# Patient Record
Sex: FEMALE | Race: WHITE | NOT HISPANIC OR LATINO
[De-identification: names, ages, dates, MRNs, and addresses within clinical notes are randomized per-mention and may not be internally consistent; named-entity substitution may affect disease eponyms.]

---

## 2017-01-13 ENCOUNTER — APPOINTMENT (OUTPATIENT)
Dept: FAMILY MEDICINE | Facility: CLINIC | Age: 77
End: 2017-01-13

## 2017-01-13 VITALS
WEIGHT: 194 LBS | HEIGHT: 65 IN | HEART RATE: 72 BPM | BODY MASS INDEX: 32.32 KG/M2 | SYSTOLIC BLOOD PRESSURE: 120 MMHG | RESPIRATION RATE: 14 BRPM | DIASTOLIC BLOOD PRESSURE: 64 MMHG

## 2017-01-19 ENCOUNTER — OUTPATIENT (OUTPATIENT)
Dept: OUTPATIENT SERVICES | Facility: HOSPITAL | Age: 77
LOS: 1 days | End: 2017-01-19
Payer: MEDICARE

## 2017-01-19 DIAGNOSIS — S81.802A UNSPECIFIED OPEN WOUND, LEFT LOWER LEG, INITIAL ENCOUNTER: ICD-10-CM

## 2017-01-19 DIAGNOSIS — I73.9 PERIPHERAL VASCULAR DISEASE, UNSPECIFIED: ICD-10-CM

## 2017-01-19 DIAGNOSIS — E08.8 DIABETES MELLITUS DUE TO UNDERLYING CONDITION WITH UNSPECIFIED COMPLICATIONS: ICD-10-CM

## 2017-01-19 PROCEDURE — 93923 UPR/LXTR ART STDY 3+ LVLS: CPT

## 2017-02-10 ENCOUNTER — APPOINTMENT (OUTPATIENT)
Dept: FAMILY MEDICINE | Facility: CLINIC | Age: 77
End: 2017-02-10

## 2017-02-10 VITALS — DIASTOLIC BLOOD PRESSURE: 70 MMHG | HEART RATE: 74 BPM | SYSTOLIC BLOOD PRESSURE: 118 MMHG | RESPIRATION RATE: 18 BRPM

## 2017-02-14 LAB
ALBUMIN SERPL ELPH-MCNC: 4.3 G/DL
ALP BLD-CCNC: 55 U/L
ALT SERPL-CCNC: 14 U/L
ANION GAP SERPL CALC-SCNC: 15 MMOL/L
AST SERPL-CCNC: 18 U/L
BASOPHILS # BLD AUTO: 0.03 K/UL
BASOPHILS NFR BLD AUTO: 0.4 %
BILIRUB SERPL-MCNC: 0.4 MG/DL
BUN SERPL-MCNC: 25 MG/DL
CALCIUM SERPL-MCNC: 10 MG/DL
CHLORIDE SERPL-SCNC: 103 MMOL/L
CHOLEST SERPL-MCNC: 132 MG/DL
CHOLEST/HDLC SERPL: 1.7 RATIO
CO2 SERPL-SCNC: 23 MMOL/L
CREAT SERPL-MCNC: 0.95 MG/DL
EOSINOPHIL # BLD AUTO: 0.13 K/UL
EOSINOPHIL NFR BLD AUTO: 1.6 %
GLUCOSE SERPL-MCNC: 148 MG/DL
HBA1C MFR BLD HPLC: 6.6 %
HCT VFR BLD CALC: 43.3 %
HDLC SERPL-MCNC: 77 MG/DL
HGB BLD-MCNC: 14.1 G/DL
IMM GRANULOCYTES NFR BLD AUTO: 0.4 %
LDLC SERPL CALC-MCNC: 44 MG/DL
LYMPHOCYTES # BLD AUTO: 2 K/UL
LYMPHOCYTES NFR BLD AUTO: 24.6 %
MAN DIFF?: NORMAL
MCHC RBC-ENTMCNC: 30.1 PG
MCHC RBC-ENTMCNC: 32.6 GM/DL
MCV RBC AUTO: 92.5 FL
MONOCYTES # BLD AUTO: 0.71 K/UL
MONOCYTES NFR BLD AUTO: 8.7 %
NEUTROPHILS # BLD AUTO: 5.22 K/UL
NEUTROPHILS NFR BLD AUTO: 64.3 %
PLATELET # BLD AUTO: 274 K/UL
POTASSIUM SERPL-SCNC: 4.9 MMOL/L
PROT SERPL-MCNC: 7.6 G/DL
RBC # BLD: 4.68 M/UL
RBC # FLD: 13.1 %
SODIUM SERPL-SCNC: 141 MMOL/L
TRIGL SERPL-MCNC: 56 MG/DL
TSH SERPL-ACNC: 1.24 UIU/ML
WBC # FLD AUTO: 8.12 K/UL

## 2017-03-05 ENCOUNTER — RX RENEWAL (OUTPATIENT)
Age: 77
End: 2017-03-05

## 2017-03-15 ENCOUNTER — RX RENEWAL (OUTPATIENT)
Age: 77
End: 2017-03-15

## 2017-05-01 ENCOUNTER — RX RENEWAL (OUTPATIENT)
Age: 77
End: 2017-05-01

## 2017-05-05 ENCOUNTER — RX RENEWAL (OUTPATIENT)
Age: 77
End: 2017-05-05

## 2017-08-04 ENCOUNTER — RX RENEWAL (OUTPATIENT)
Age: 77
End: 2017-08-04

## 2017-08-11 ENCOUNTER — APPOINTMENT (OUTPATIENT)
Dept: FAMILY MEDICINE | Facility: CLINIC | Age: 77
End: 2017-08-11

## 2017-08-11 ENCOUNTER — APPOINTMENT (OUTPATIENT)
Dept: FAMILY MEDICINE | Facility: CLINIC | Age: 77
End: 2017-08-11
Payer: MEDICARE

## 2017-08-11 VITALS
SYSTOLIC BLOOD PRESSURE: 138 MMHG | HEIGHT: 65 IN | DIASTOLIC BLOOD PRESSURE: 83 MMHG | OXYGEN SATURATION: 97 % | TEMPERATURE: 98.5 F | BODY MASS INDEX: 33.32 KG/M2 | WEIGHT: 200 LBS | HEART RATE: 64 BPM

## 2017-08-11 DIAGNOSIS — M25.511 PAIN IN RIGHT SHOULDER: ICD-10-CM

## 2017-08-11 DIAGNOSIS — M25.561 PAIN IN RIGHT KNEE: ICD-10-CM

## 2017-08-11 DIAGNOSIS — R31.29 OTHER MICROSCOPIC HEMATURIA: ICD-10-CM

## 2017-08-11 PROCEDURE — 99214 OFFICE O/P EST MOD 30 MIN: CPT

## 2017-08-11 RX ORDER — FLUOCINONIDE 0.05 MG/G
0.05 OINTMENT TOPICAL
Qty: 120 | Refills: 0 | Status: COMPLETED | COMMUNITY
Start: 2017-03-02

## 2017-08-11 RX ORDER — TRIAMCINOLONE ACETONIDE 1 MG/G
0.1 CREAM TOPICAL
Qty: 15 | Refills: 0 | Status: COMPLETED | COMMUNITY
Start: 2017-03-21

## 2017-08-11 RX ORDER — CLOBETASOL PROPIONATE 0.5 MG/G
0.05 OINTMENT TOPICAL
Qty: 60 | Refills: 0 | Status: COMPLETED | COMMUNITY
Start: 2017-04-04

## 2017-08-11 RX ORDER — METFORMIN HYDROCHLORIDE 500 MG/1
500 TABLET, COATED ORAL DAILY
Refills: 0 | Status: COMPLETED | COMMUNITY
End: 2017-08-11

## 2017-08-11 RX ORDER — IMIQUIMOD 37.5 MG/G
3.75 CREAM TOPICAL
Qty: 28 | Refills: 0 | Status: COMPLETED | COMMUNITY
Start: 2017-03-22

## 2017-08-23 ENCOUNTER — FORM ENCOUNTER (OUTPATIENT)
Age: 77
End: 2017-08-23

## 2017-08-24 ENCOUNTER — OUTPATIENT (OUTPATIENT)
Dept: OUTPATIENT SERVICES | Facility: HOSPITAL | Age: 77
LOS: 1 days | End: 2017-08-24
Payer: MEDICARE

## 2017-08-24 ENCOUNTER — APPOINTMENT (OUTPATIENT)
Dept: RADIOLOGY | Facility: HOSPITAL | Age: 77
End: 2017-08-24
Payer: MEDICARE

## 2017-08-24 DIAGNOSIS — M17.11 UNILATERAL PRIMARY OSTEOARTHRITIS, RIGHT KNEE: ICD-10-CM

## 2017-08-24 DIAGNOSIS — M19.011 PRIMARY OSTEOARTHRITIS, RIGHT SHOULDER: ICD-10-CM

## 2017-08-24 PROCEDURE — 73562 X-RAY EXAM OF KNEE 3: CPT | Mod: 26,RT

## 2017-08-24 PROCEDURE — 73030 X-RAY EXAM OF SHOULDER: CPT | Mod: 26,RT

## 2017-08-24 PROCEDURE — 73030 X-RAY EXAM OF SHOULDER: CPT

## 2017-08-24 PROCEDURE — 73562 X-RAY EXAM OF KNEE 3: CPT

## 2017-09-06 ENCOUNTER — RX RENEWAL (OUTPATIENT)
Age: 77
End: 2017-09-06

## 2017-09-07 ENCOUNTER — RESULT REVIEW (OUTPATIENT)
Age: 77
End: 2017-09-07

## 2017-09-07 ENCOUNTER — MEDICATION RENEWAL (OUTPATIENT)
Age: 77
End: 2017-09-07

## 2017-09-07 LAB
25(OH)D3 SERPL-MCNC: 19 NG/ML
ALBUMIN SERPL ELPH-MCNC: 3.4 G/DL
ALP BLD-CCNC: 49 U/L
ALT SERPL-CCNC: 15 U/L
ANION GAP SERPL CALC-SCNC: 16 MMOL/L
APPEARANCE: CLEAR
AST SERPL-CCNC: 20 U/L
BACTERIA UR CULT: ABNORMAL
BASOPHILS # BLD AUTO: 0.02 K/UL
BASOPHILS NFR BLD AUTO: 0.3 %
BILIRUB DIRECT SERPL-MCNC: 0.1 MG/DL
BILIRUB INDIRECT SERPL-MCNC: 0.3 MG/DL
BILIRUB SERPL-MCNC: 0.4 MG/DL
BILIRUBIN URINE: NEGATIVE
BLOOD URINE: NEGATIVE
BUN SERPL-MCNC: 22 MG/DL
CALCIUM SERPL-MCNC: 9.5 MG/DL
CHLORIDE SERPL-SCNC: 103 MMOL/L
CHOLEST SERPL-MCNC: 118 MG/DL
CHOLEST/HDLC SERPL: 1.7 RATIO
CO2 SERPL-SCNC: 19 MMOL/L
COLOR: YELLOW
CREAT SERPL-MCNC: 1.01 MG/DL
CREAT SPEC-SCNC: 93 MG/DL
EOSINOPHIL # BLD AUTO: 0.07 K/UL
EOSINOPHIL NFR BLD AUTO: 1.1 %
FOLATE SERPL-MCNC: 13.5 NG/ML
GLUCOSE QUALITATIVE U: NORMAL MG/DL
GLUCOSE SERPL-MCNC: 140 MG/DL
HAV IGM SER QL: NONREACTIVE
HBA1C MFR BLD HPLC: 6.8 %
HBV CORE IGM SER QL: NONREACTIVE
HBV SURFACE AG SER QL: NONREACTIVE
HCT VFR BLD CALC: 38.1 %
HCV AB SER QL: NONREACTIVE
HCV S/CO RATIO: 0.08 S/CO
HDLC SERPL-MCNC: 68 MG/DL
HGB BLD-MCNC: 12.5 G/DL
IMM GRANULOCYTES NFR BLD AUTO: 0.5 %
KETONES URINE: NEGATIVE
LDLC SERPL CALC-MCNC: 38 MG/DL
LEUKOCYTE ESTERASE URINE: ABNORMAL
LYMPHOCYTES # BLD AUTO: 1.74 K/UL
LYMPHOCYTES NFR BLD AUTO: 28.1 %
MAN DIFF?: NORMAL
MCHC RBC-ENTMCNC: 31.1 PG
MCHC RBC-ENTMCNC: 32.8 GM/DL
MCV RBC AUTO: 94.8 FL
MICROALBUMIN 24H UR DL<=1MG/L-MCNC: 3.3 MG/DL
MICROALBUMIN/CREAT 24H UR-RTO: 35 MG/G
MONOCYTES # BLD AUTO: 0.87 K/UL
MONOCYTES NFR BLD AUTO: 14 %
NEUTROPHILS # BLD AUTO: 3.47 K/UL
NEUTROPHILS NFR BLD AUTO: 56 %
NITRITE URINE: POSITIVE
PH URINE: 6
PLATELET # BLD AUTO: 225 K/UL
POTASSIUM SERPL-SCNC: 4.7 MMOL/L
PROT SERPL-MCNC: 7 G/DL
PROTEIN URINE: ABNORMAL MG/DL
RBC # BLD: 4.02 M/UL
RBC # FLD: 12.7 %
SODIUM SERPL-SCNC: 138 MMOL/L
SPECIFIC GRAVITY URINE: 1.02
TRIGL SERPL-MCNC: 60 MG/DL
TSH SERPL-ACNC: 1.82 UIU/ML
URATE SERPL-MCNC: 4.6 MG/DL
UROBILINOGEN URINE: 1 MG/DL
VIT B12 SERPL-MCNC: 247 PG/ML
WBC # FLD AUTO: 6.2 K/UL

## 2017-09-19 ENCOUNTER — APPOINTMENT (OUTPATIENT)
Dept: FAMILY MEDICINE | Facility: CLINIC | Age: 77
End: 2017-09-19

## 2017-10-03 ENCOUNTER — RX RENEWAL (OUTPATIENT)
Age: 77
End: 2017-10-03

## 2017-10-05 ENCOUNTER — RX RENEWAL (OUTPATIENT)
Age: 77
End: 2017-10-05

## 2017-10-23 ENCOUNTER — APPOINTMENT (OUTPATIENT)
Dept: FAMILY MEDICINE | Facility: CLINIC | Age: 77
End: 2017-10-23
Payer: MEDICARE

## 2017-10-23 VITALS
WEIGHT: 205 LBS | HEART RATE: 70 BPM | OXYGEN SATURATION: 94 % | HEIGHT: 63.5 IN | SYSTOLIC BLOOD PRESSURE: 153 MMHG | DIASTOLIC BLOOD PRESSURE: 93 MMHG | BODY MASS INDEX: 35.87 KG/M2

## 2017-10-23 DIAGNOSIS — Z87.898 PERSONAL HISTORY OF OTHER SPECIFIED CONDITIONS: ICD-10-CM

## 2017-10-23 DIAGNOSIS — Z83.3 FAMILY HISTORY OF DIABETES MELLITUS: ICD-10-CM

## 2017-10-23 PROCEDURE — 99214 OFFICE O/P EST MOD 30 MIN: CPT

## 2017-10-23 RX ORDER — LIFITEGRAST 50 MG/ML
5 SOLUTION/ DROPS OPHTHALMIC
Qty: 60 | Refills: 0 | Status: COMPLETED | COMMUNITY
Start: 2017-09-05

## 2017-10-23 RX ORDER — CIPROFLOXACIN HYDROCHLORIDE 250 MG/1
250 TABLET, FILM COATED ORAL TWICE DAILY
Qty: 10 | Refills: 0 | Status: COMPLETED | COMMUNITY
Start: 2017-09-07 | End: 2017-10-23

## 2017-10-24 LAB
APPEARANCE: CLEAR
BACTERIA: ABNORMAL
BILIRUBIN URINE: NEGATIVE
BLOOD URINE: NEGATIVE
COLOR: YELLOW
GLUCOSE QUALITATIVE U: NEGATIVE MG/DL
HYALINE CASTS: 3 /LPF
KETONES URINE: NEGATIVE
LEUKOCYTE ESTERASE URINE: ABNORMAL
MICROSCOPIC-UA: NORMAL
NITRITE URINE: NEGATIVE
PH URINE: 5
PROTEIN URINE: NEGATIVE MG/DL
RED BLOOD CELLS URINE: 2 /HPF
SPECIFIC GRAVITY URINE: 1.01
SQUAMOUS EPITHELIAL CELLS: 0 /HPF
UROBILINOGEN URINE: NEGATIVE MG/DL
WHITE BLOOD CELLS URINE: 40 /HPF

## 2017-11-07 ENCOUNTER — RX RENEWAL (OUTPATIENT)
Age: 77
End: 2017-11-07

## 2017-11-21 ENCOUNTER — APPOINTMENT (OUTPATIENT)
Dept: FAMILY MEDICINE | Facility: CLINIC | Age: 77
End: 2017-11-21
Payer: MEDICARE

## 2017-11-21 VITALS
DIASTOLIC BLOOD PRESSURE: 80 MMHG | HEART RATE: 44 BPM | BODY MASS INDEX: 37.8 KG/M2 | SYSTOLIC BLOOD PRESSURE: 130 MMHG | OXYGEN SATURATION: 98 % | HEIGHT: 63.5 IN | WEIGHT: 216 LBS

## 2017-11-21 DIAGNOSIS — H54.7 UNSPECIFIED VISUAL LOSS: ICD-10-CM

## 2017-11-21 DIAGNOSIS — H26.9 UNSPECIFIED CATARACT: ICD-10-CM

## 2017-11-21 DIAGNOSIS — R94.31 ABNORMAL ELECTROCARDIOGRAM [ECG] [EKG]: ICD-10-CM

## 2017-11-21 PROCEDURE — 99215 OFFICE O/P EST HI 40 MIN: CPT | Mod: 25

## 2017-11-21 PROCEDURE — 93000 ELECTROCARDIOGRAM COMPLETE: CPT | Mod: 59

## 2017-11-21 PROCEDURE — 36415 COLL VENOUS BLD VENIPUNCTURE: CPT

## 2017-11-21 RX ORDER — VIT C/E/ZN/COPPR/LUTEIN/ZEAXAN 250MG-90MG
CAPSULE ORAL
Refills: 0 | Status: ACTIVE | COMMUNITY
Start: 2017-11-21

## 2017-11-22 LAB
25(OH)D3 SERPL-MCNC: 20.2 NG/ML
ALBUMIN SERPL ELPH-MCNC: 3.9 G/DL
ALP BLD-CCNC: 54 U/L
ALT SERPL-CCNC: 10 U/L
ANION GAP SERPL CALC-SCNC: 15 MMOL/L
AST SERPL-CCNC: 18 U/L
BASOPHILS # BLD AUTO: 0.02 K/UL
BASOPHILS NFR BLD AUTO: 0.2 %
BILIRUB DIRECT SERPL-MCNC: 0.1 MG/DL
BILIRUB INDIRECT SERPL-MCNC: 0.2 MG/DL
BILIRUB SERPL-MCNC: 0.3 MG/DL
BUN SERPL-MCNC: 23 MG/DL
CALCIUM SERPL-MCNC: 9.4 MG/DL
CHLORIDE SERPL-SCNC: 101 MMOL/L
CHOLEST SERPL-MCNC: 121 MG/DL
CHOLEST/HDLC SERPL: 1.7 RATIO
CO2 SERPL-SCNC: 21 MMOL/L
CREAT SERPL-MCNC: 1.04 MG/DL
EOSINOPHIL # BLD AUTO: 0.08 K/UL
EOSINOPHIL NFR BLD AUTO: 1 %
GLUCOSE SERPL-MCNC: 151 MG/DL
HBA1C MFR BLD HPLC: 6.5 %
HCT VFR BLD CALC: 40.4 %
HDLC SERPL-MCNC: 71 MG/DL
HGB BLD-MCNC: 13.3 G/DL
IMM GRANULOCYTES NFR BLD AUTO: 0.5 %
LDLC SERPL CALC-MCNC: 35 MG/DL
LYMPHOCYTES # BLD AUTO: 1.96 K/UL
LYMPHOCYTES NFR BLD AUTO: 23.7 %
MAN DIFF?: NORMAL
MCHC RBC-ENTMCNC: 31 PG
MCHC RBC-ENTMCNC: 32.9 GM/DL
MCV RBC AUTO: 94.2 FL
MONOCYTES # BLD AUTO: 0.58 K/UL
MONOCYTES NFR BLD AUTO: 7 %
NEUTROPHILS # BLD AUTO: 5.58 K/UL
NEUTROPHILS NFR BLD AUTO: 67.6 %
NT-PROBNP SERPL-MCNC: 159 PG/ML
PLATELET # BLD AUTO: 236 K/UL
POTASSIUM SERPL-SCNC: 4.8 MMOL/L
PROT SERPL-MCNC: 6.8 G/DL
RBC # BLD: 4.29 M/UL
RBC # FLD: 13.8 %
SODIUM SERPL-SCNC: 137 MMOL/L
TRIGL SERPL-MCNC: 77 MG/DL
TSH SERPL-ACNC: 1.2 UIU/ML
WBC # FLD AUTO: 8.26 K/UL

## 2017-12-04 ENCOUNTER — RX RENEWAL (OUTPATIENT)
Age: 77
End: 2017-12-04

## 2017-12-07 ENCOUNTER — RX RENEWAL (OUTPATIENT)
Age: 77
End: 2017-12-07

## 2018-01-02 ENCOUNTER — RX RENEWAL (OUTPATIENT)
Age: 78
End: 2018-01-02

## 2018-01-03 ENCOUNTER — RX RENEWAL (OUTPATIENT)
Age: 78
End: 2018-01-03

## 2018-01-06 ENCOUNTER — RX RENEWAL (OUTPATIENT)
Age: 78
End: 2018-01-06

## 2018-01-08 ENCOUNTER — RX RENEWAL (OUTPATIENT)
Age: 78
End: 2018-01-08

## 2018-03-13 ENCOUNTER — RX RENEWAL (OUTPATIENT)
Age: 78
End: 2018-03-13

## 2018-03-28 ENCOUNTER — OTHER (OUTPATIENT)
Age: 78
End: 2018-03-28

## 2018-04-18 ENCOUNTER — RX RENEWAL (OUTPATIENT)
Age: 78
End: 2018-04-18

## 2018-04-18 ENCOUNTER — APPOINTMENT (OUTPATIENT)
Dept: FAMILY MEDICINE | Facility: CLINIC | Age: 78
End: 2018-04-18
Payer: MEDICARE

## 2018-04-18 VITALS
BODY MASS INDEX: 36.75 KG/M2 | DIASTOLIC BLOOD PRESSURE: 80 MMHG | WEIGHT: 210 LBS | TEMPERATURE: 98 F | SYSTOLIC BLOOD PRESSURE: 160 MMHG | OXYGEN SATURATION: 98 % | HEART RATE: 67 BPM | HEIGHT: 63.5 IN

## 2018-04-18 DIAGNOSIS — Z01.818 ENCOUNTER FOR OTHER PREPROCEDURAL EXAMINATION: ICD-10-CM

## 2018-04-18 PROCEDURE — 99214 OFFICE O/P EST MOD 30 MIN: CPT

## 2018-04-18 RX ORDER — NEOMYCIN AND POLYMYXIN B SULFATES AND DEXAMETHASONE 3.5; 10000; 1 MG/G; [IU]/G; MG/G
3.5-10000-0.1 OINTMENT OPHTHALMIC
Qty: 4 | Refills: 0 | Status: COMPLETED | COMMUNITY
Start: 2018-01-03

## 2018-04-18 RX ORDER — GATIFLOXACIN 5 MG/ML
0.5 SOLUTION/ DROPS OPHTHALMIC
Qty: 2 | Refills: 0 | Status: COMPLETED | COMMUNITY
Start: 2017-11-16

## 2018-04-18 RX ORDER — SULFAMETHOXAZOLE AND TRIMETHOPRIM 400; 80 MG/1; MG/1
400-80 TABLET ORAL
Qty: 10 | Refills: 0 | Status: COMPLETED | COMMUNITY
Start: 2017-10-31

## 2018-04-18 RX ORDER — PREDNISOLONE ACETATE 10 MG/ML
1 SUSPENSION/ DROPS OPHTHALMIC
Qty: 5 | Refills: 0 | Status: COMPLETED | COMMUNITY
Start: 2017-12-11

## 2018-04-24 ENCOUNTER — FORM ENCOUNTER (OUTPATIENT)
Age: 78
End: 2018-04-24

## 2018-04-25 ENCOUNTER — APPOINTMENT (OUTPATIENT)
Dept: MRI IMAGING | Facility: HOSPITAL | Age: 78
End: 2018-04-25
Payer: MEDICARE

## 2018-04-25 ENCOUNTER — OUTPATIENT (OUTPATIENT)
Dept: OUTPATIENT SERVICES | Facility: HOSPITAL | Age: 78
LOS: 1 days | End: 2018-04-25
Payer: MEDICARE

## 2018-04-25 DIAGNOSIS — S39.92XA UNSPECIFIED INJURY OF LOWER BACK, INITIAL ENCOUNTER: ICD-10-CM

## 2018-04-25 DIAGNOSIS — H25.89 OTHER AGE-RELATED CATARACT: Chronic | ICD-10-CM

## 2018-04-25 DIAGNOSIS — M54.9 DORSALGIA, UNSPECIFIED: ICD-10-CM

## 2018-04-25 PROCEDURE — 72148 MRI LUMBAR SPINE W/O DYE: CPT | Mod: 26

## 2018-04-25 PROCEDURE — 72148 MRI LUMBAR SPINE W/O DYE: CPT

## 2018-05-11 ENCOUNTER — LABORATORY RESULT (OUTPATIENT)
Age: 78
End: 2018-05-11

## 2018-05-14 ENCOUNTER — RX RENEWAL (OUTPATIENT)
Age: 78
End: 2018-05-14

## 2018-05-14 ENCOUNTER — APPOINTMENT (OUTPATIENT)
Dept: ORTHOPEDIC SURGERY | Facility: CLINIC | Age: 78
End: 2018-05-14
Payer: MEDICARE

## 2018-05-14 VITALS
HEART RATE: 69 BPM | SYSTOLIC BLOOD PRESSURE: 150 MMHG | HEIGHT: 63.5 IN | BODY MASS INDEX: 35.7 KG/M2 | WEIGHT: 204 LBS | DIASTOLIC BLOOD PRESSURE: 79 MMHG

## 2018-05-14 DIAGNOSIS — Z87.891 PERSONAL HISTORY OF NICOTINE DEPENDENCE: ICD-10-CM

## 2018-05-14 DIAGNOSIS — Z87.39 PERSONAL HISTORY OF OTHER DISEASES OF THE MUSCULOSKELETAL SYSTEM AND CONNECTIVE TISSUE: ICD-10-CM

## 2018-05-14 DIAGNOSIS — M43.17 SPONDYLOLISTHESIS, LUMBOSACRAL REGION: ICD-10-CM

## 2018-05-14 DIAGNOSIS — E11.9 TYPE 2 DIABETES MELLITUS W/OUT COMPLICATIONS: ICD-10-CM

## 2018-05-14 DIAGNOSIS — Z80.9 FAMILY HISTORY OF MALIGNANT NEOPLASM, UNSPECIFIED: ICD-10-CM

## 2018-05-14 LAB
25(OH)D3 SERPL-MCNC: 25.8 NG/ML
ANION GAP SERPL CALC-SCNC: 15 MMOL/L
BASOPHILS # BLD AUTO: 0.02 K/UL
BASOPHILS NFR BLD AUTO: 0.3 %
BUN SERPL-MCNC: 32 MG/DL
CALCIUM SERPL-MCNC: 9.9 MG/DL
CHLORIDE SERPL-SCNC: 104 MMOL/L
CHOLEST SERPL-MCNC: 125 MG/DL
CHOLEST/HDLC SERPL: 1.9 RATIO
CO2 SERPL-SCNC: 21 MMOL/L
CREAT SERPL-MCNC: 1.09 MG/DL
CREAT SPEC-SCNC: 90 MG/DL
EOSINOPHIL # BLD AUTO: 0.12 K/UL
EOSINOPHIL NFR BLD AUTO: 1.8 %
FOLATE SERPL-MCNC: 10.5 NG/ML
GLUCOSE SERPL-MCNC: 152 MG/DL
HCT VFR BLD CALC: 39.4 %
HDLC SERPL-MCNC: 66 MG/DL
HGB BLD-MCNC: 13.2 G/DL
IMM GRANULOCYTES NFR BLD AUTO: 0.5 %
LDLC SERPL CALC-MCNC: 50 MG/DL
LYMPHOCYTES # BLD AUTO: 2.41 K/UL
LYMPHOCYTES NFR BLD AUTO: 37.1 %
MAN DIFF?: NORMAL
MCHC RBC-ENTMCNC: 30.8 PG
MCHC RBC-ENTMCNC: 33.5 GM/DL
MCV RBC AUTO: 92.1 FL
MICROALBUMIN 24H UR DL<=1MG/L-MCNC: 3.4 MG/DL
MICROALBUMIN/CREAT 24H UR-RTO: 38 MG/G
MONOCYTES # BLD AUTO: 0.37 K/UL
MONOCYTES NFR BLD AUTO: 5.7 %
NEUTROPHILS # BLD AUTO: 3.55 K/UL
NEUTROPHILS NFR BLD AUTO: 54.6 %
PLATELET # BLD AUTO: 243 K/UL
POTASSIUM SERPL-SCNC: 5.3 MMOL/L
RBC # BLD: 4.28 M/UL
RBC # FLD: 13.5 %
SODIUM SERPL-SCNC: 140 MMOL/L
TRIGL SERPL-MCNC: 44 MG/DL
URATE SERPL-MCNC: 4.7 MG/DL
VIT B12 SERPL-MCNC: 262 PG/ML
WBC # FLD AUTO: 6.5 K/UL

## 2018-05-14 PROCEDURE — 72110 X-RAY EXAM L-2 SPINE 4/>VWS: CPT

## 2018-05-14 PROCEDURE — 99204 OFFICE O/P NEW MOD 45 MIN: CPT

## 2018-05-14 RX ORDER — MUPIROCIN 2 G/100G
2 CREAM TOPICAL TWICE DAILY
Qty: 1 | Refills: 0 | Status: DISCONTINUED | COMMUNITY
Start: 2017-01-13 | End: 2018-05-14

## 2018-05-16 ENCOUNTER — RX RENEWAL (OUTPATIENT)
Age: 78
End: 2018-05-16

## 2018-05-18 ENCOUNTER — APPOINTMENT (OUTPATIENT)
Dept: FAMILY MEDICINE | Facility: CLINIC | Age: 78
End: 2018-05-18
Payer: MEDICARE

## 2018-05-18 VITALS
DIASTOLIC BLOOD PRESSURE: 62 MMHG | OXYGEN SATURATION: 100 % | HEART RATE: 65 BPM | HEIGHT: 63 IN | BODY MASS INDEX: 37.03 KG/M2 | WEIGHT: 209 LBS | TEMPERATURE: 98.3 F | SYSTOLIC BLOOD PRESSURE: 158 MMHG

## 2018-05-18 DIAGNOSIS — G89.29 DORSALGIA, UNSPECIFIED: ICD-10-CM

## 2018-05-18 DIAGNOSIS — M54.9 DORSALGIA, UNSPECIFIED: ICD-10-CM

## 2018-05-18 DIAGNOSIS — E78.5 HYPERLIPIDEMIA, UNSPECIFIED: ICD-10-CM

## 2018-05-18 PROCEDURE — 99214 OFFICE O/P EST MOD 30 MIN: CPT | Mod: 25

## 2018-05-18 PROCEDURE — G0447 BEHAVIOR COUNSEL OBESITY 15M: CPT | Mod: 59

## 2018-05-18 RX ORDER — SIMVASTATIN 80 MG/1
TABLET, FILM COATED ORAL
Refills: 0 | Status: COMPLETED | COMMUNITY
End: 2018-05-18

## 2018-05-18 NOTE — PHYSICAL EXAM
[No Acute Distress] : no acute distress [Normal Sclera/Conjunctiva] : normal sclera/conjunctiva [EOMI] : extraocular movements intact [Normal Outer Ear/Nose] : the outer ears and nose were normal in appearance [Normal Oropharynx] : the oropharynx was normal [No JVD] : no jugular venous distention [Supple] : supple [No Lymphadenopathy] : no lymphadenopathy [No Respiratory Distress] : no respiratory distress  [Clear to Auscultation] : lungs were clear to auscultation bilaterally [No Accessory Muscle Use] : no accessory muscle use [Normal Rate] : normal rate  [Regular Rhythm] : with a regular rhythm [Normal S1, S2] : normal S1 and S2 [No Murmur] : no murmur heard [No Abdominal Bruit] : a ~M bruit was not heard ~T in the abdomen [No Varicosities] : no varicosities [No Edema] : there was no peripheral edema [Soft] : abdomen soft [Non Tender] : non-tender [Non-distended] : non-distended [No Masses] : no abdominal mass palpated [No HSM] : no HSM [Normal Bowel Sounds] : normal bowel sounds [Normal Posterior Cervical Nodes] : no posterior cervical lymphadenopathy [Normal Anterior Cervical Nodes] : no anterior cervical lymphadenopathy [No CVA Tenderness] : no CVA  tenderness [No Spinal Tenderness] : no spinal tenderness [No Joint Swelling] : no joint swelling [Grossly Normal Strength/Tone] : grossly normal strength/tone [Normal Gait] : normal gait [Coordination Grossly Intact] : coordination grossly intact [No Focal Deficits] : no focal deficits [Deep Tendon Reflexes (DTR)] : deep tendon reflexes were 2+ and symmetric [Normal Affect] : the affect was normal [Alert and Oriented x3] : oriented to person, place, and time [Normal Insight/Judgement] : insight and judgment were intact [Comprehensive Foot Exam Normal] : Right and left foot were examined and both feet are normal. No ulcers in either foot. Toes are normal and with full ROM.  Normal tactile sensation with monofilament testing throughout both feet [de-identified] : Obese

## 2018-05-18 NOTE — HISTORY OF PRESENT ILLNESS
[Diabetes Mellitus] : Diabetes Mellitus [Hyperlipidemia] : Hyperlipidemia [Hypertension] : Hypertension [Obesity] : Obesity [No episodes] : No hypoglycemic episodes since the last visit. [Check glucose ___ x/week] : Patient checks blood glucose [unfilled]  times a week [Understanding of foot care] : Patient expressed understanding of foot care [Retinopathy] :  retinopathy [Most Recent A1C: ___] : Most recent A1C was [unfilled] [Near target goal] : A1C near target goal [Does not check BP] : The patient is not checking blood pressure [Doing Well] : Patient is doing well [Managed with medications] : managed with  medication [Weight Gain ___ Pounds] : Weight gain of [unfilled] pounds [___ # of attempts] : [unfilled] weight lost attempts [Sedentary] : Patient is sedentary [Good understanding] : Patient has a good understanding of disease, goals and obesity follow-up plan [FreeTextEntry6] : Patient came to f/u on her weight, HLD, diabetes, blood pressure, Insomnia . Patient takes her medication daily, no Cp,no SOB,no Abd pain, no N,V,C,D. Pt. seen by Ortho spine - referred to pain management. Pt. blood work d/w her at length - HbA1C increased - no changes with meds - DIET, weight loss recommended . Pt. gained weight , obesity counseling completed.  [Regular podiatrist visits] : Patient did not have regular podiatrist visit [<130/90] : Target blood pressure is  <130/90 [Target goal not met] : BP target goal not met

## 2018-05-18 NOTE — REVIEW OF SYSTEMS
[Joint Pain] : joint pain [Muscle Pain] : muscle pain [Back Pain] : back pain [Negative] : Heme/Lymph [Joint Stiffness] : no joint stiffness [Joint Swelling] : no joint swelling [Muscle Weakness] : no muscle weakness [FreeTextEntry9] : chronic

## 2018-05-18 NOTE — COUNSELING
[Weight management counseling provided] : Weight management [Healthy eating counseling provided] : healthy eating [Activity counseling provided] : activity [Weight Self Once Weekly] : Weight self once weekly [Low Fat Diet] : Low fat diet [Low Salt Diet] : Low salt diet [Good understanding] : Patient has a good understanding of lifestyle changes and the steps needed to achieve self management goals [Disease Management counseling provided] : disease management  [Decrease Portions] : Decrease food portions [Take medications as directed] : Take medications as directed [Take your weight daily] : Take your weight daily [ - Behavioral Counseling for Obesity (Face-to-Face for 15 Minutes)] : Behavioral Counseling for Obesity (Face-to-Face for 15 Minutes)

## 2018-06-01 ENCOUNTER — RX RENEWAL (OUTPATIENT)
Age: 78
End: 2018-06-01

## 2018-06-06 ENCOUNTER — APPOINTMENT (OUTPATIENT)
Dept: OBGYN | Facility: CLINIC | Age: 78
End: 2018-06-06
Payer: MEDICARE

## 2018-06-06 VITALS
OXYGEN SATURATION: 98 % | HEART RATE: 73 BPM | BODY MASS INDEX: 37.03 KG/M2 | WEIGHT: 209 LBS | HEIGHT: 63 IN | DIASTOLIC BLOOD PRESSURE: 78 MMHG | SYSTOLIC BLOOD PRESSURE: 122 MMHG

## 2018-06-06 DIAGNOSIS — R92.2 INCONCLUSIVE MAMMOGRAM: ICD-10-CM

## 2018-06-06 DIAGNOSIS — Z63.4 DISAPPEARANCE AND DEATH OF FAMILY MEMBER: ICD-10-CM

## 2018-06-06 DIAGNOSIS — Z12.31 ENCOUNTER FOR SCREENING MAMMOGRAM FOR MALIGNANT NEOPLASM OF BREAST: ICD-10-CM

## 2018-06-06 DIAGNOSIS — Z01.419 ENCOUNTER FOR GYNECOLOGICAL EXAMINATION (GENERAL) (ROUTINE) W/OUT ABNORMAL FINDINGS: ICD-10-CM

## 2018-06-06 DIAGNOSIS — Z13.820 ENCOUNTER FOR SCREENING FOR OSTEOPOROSIS: ICD-10-CM

## 2018-06-06 PROCEDURE — G0101: CPT

## 2018-06-06 SDOH — SOCIAL STABILITY - SOCIAL INSECURITY: DISSAPEARANCE AND DEATH OF FAMILY MEMBER: Z63.4

## 2018-06-07 LAB — HPV HIGH+LOW RISK DNA PNL CVX: NOT DETECTED

## 2018-06-11 ENCOUNTER — RX RENEWAL (OUTPATIENT)
Age: 78
End: 2018-06-11

## 2018-06-12 ENCOUNTER — RX RENEWAL (OUTPATIENT)
Age: 78
End: 2018-06-12

## 2018-06-12 LAB — CYTOLOGY CVX/VAG DOC THIN PREP: NORMAL

## 2018-06-22 ENCOUNTER — APPOINTMENT (OUTPATIENT)
Dept: FAMILY MEDICINE | Facility: CLINIC | Age: 78
End: 2018-06-22
Payer: MEDICARE

## 2018-06-22 VITALS — SYSTOLIC BLOOD PRESSURE: 122 MMHG | DIASTOLIC BLOOD PRESSURE: 70 MMHG

## 2018-06-22 VITALS
DIASTOLIC BLOOD PRESSURE: 74 MMHG | WEIGHT: 207 LBS | BODY MASS INDEX: 36.68 KG/M2 | HEART RATE: 70 BPM | HEIGHT: 63 IN | SYSTOLIC BLOOD PRESSURE: 138 MMHG | RESPIRATION RATE: 16 BRPM | TEMPERATURE: 97.7 F | OXYGEN SATURATION: 98 %

## 2018-06-22 DIAGNOSIS — F51.01 PRIMARY INSOMNIA: ICD-10-CM

## 2018-06-22 DIAGNOSIS — M43.06 SPONDYLOLYSIS, LUMBAR REGION: ICD-10-CM

## 2018-06-22 PROCEDURE — 99214 OFFICE O/P EST MOD 30 MIN: CPT

## 2018-06-22 RX ORDER — ZOLPIDEM TARTRATE 5 MG/1
5 TABLET ORAL
Qty: 30 | Refills: 0 | Status: COMPLETED | COMMUNITY
Start: 2018-05-16

## 2018-06-22 NOTE — PHYSICAL EXAM
[No Acute Distress] : no acute distress [Normal Sclera/Conjunctiva] : normal sclera/conjunctiva [EOMI] : extraocular movements intact [Normal Outer Ear/Nose] : the outer ears and nose were normal in appearance [Normal Oropharynx] : the oropharynx was normal [No JVD] : no jugular venous distention [Supple] : supple [No Lymphadenopathy] : no lymphadenopathy [No Respiratory Distress] : no respiratory distress  [Clear to Auscultation] : lungs were clear to auscultation bilaterally [No Accessory Muscle Use] : no accessory muscle use [Normal Rate] : normal rate  [Regular Rhythm] : with a regular rhythm [Normal S1, S2] : normal S1 and S2 [No Murmur] : no murmur heard [No Abdominal Bruit] : a ~M bruit was not heard ~T in the abdomen [No Varicosities] : no varicosities [Soft] : abdomen soft [Non Tender] : non-tender [Non-distended] : non-distended [No Masses] : no abdominal mass palpated [No HSM] : no HSM [Normal Bowel Sounds] : normal bowel sounds [Normal Posterior Cervical Nodes] : no posterior cervical lymphadenopathy [Normal Anterior Cervical Nodes] : no anterior cervical lymphadenopathy [No CVA Tenderness] : no CVA  tenderness [No Spinal Tenderness] : no spinal tenderness [No Joint Swelling] : no joint swelling [Grossly Normal Strength/Tone] : grossly normal strength/tone [Coordination Grossly Intact] : coordination grossly intact [No Focal Deficits] : no focal deficits [Normal Affect] : the affect was normal [Alert and Oriented x3] : oriented to person, place, and time [Normal Insight/Judgement] : insight and judgment were intact [de-identified] : Obese  [de-identified] : + b/l LE edema  [de-identified] : walking with the  cane

## 2018-06-22 NOTE — REVIEW OF SYSTEMS
[Joint Pain] : no joint pain [Joint Stiffness] : no joint stiffness [Joint Swelling] : no joint swelling [Muscle Weakness] : no muscle weakness [Muscle Pain] : no muscle pain [Back Pain] : back pain [Negative] : Heme/Lymph

## 2018-06-22 NOTE — HISTORY OF PRESENT ILLNESS
[Diabetes Mellitus] : Diabetes Mellitus [Hypertension] : Hypertension [Obesity] : Obesity [FreeTextEntry6] : Patient came to f/u on her chronic back pain, DM type 2 , Obesity,  HTN, Insomnia. pt. tried to decrease Ambien to 5 mg - did not tolerate well. Pt, seen by  and Dr. Benedict - waiting for Epidural . patient denies CP,SOB,no Abd pain, + chronic back pain, standing and walking makes it worse.  patient is trying to walk more with the walker.  [Does not check] : Patient does not check blood glucose regularly [Understanding of foot care] : Patient expressed understanding of foot care [Most Recent A1C: ___] : Most recent A1C was [unfilled] [Does not check BP] : The patient is not checking blood pressure [<130/90] : Target blood pressure is  <130/90 [Near target goal] : BP near target goal [Stable] : Patient is stable [Managed with medications] : managed with  medication [Weight Loss ___ Pounds] : Weight loss of [unfilled] pounds [___ # of attempts] : [unfilled] weight lost attempts [___ times per week] : Patient exercises [unfilled] times per week [Preparation] : Preparation: patient made attempts to lose weight in the last year and is planning to lose weight within the next month. [Good understanding] : Patient has a good understanding of disease, goals and obesity follow-up plan

## 2018-06-22 NOTE — COUNSELING
[Weight management counseling provided] : Weight management [Healthy eating counseling provided] : healthy eating [Activity counseling provided] : activity [Walking] : Walking [Decrease Portions] : Decrease food portions [Keep Food Diary] : Keep food diary [Good understanding] : Patient has a good understanding of lifestyle changes and the steps needed to achieve self management goals

## 2018-07-05 ENCOUNTER — OUTPATIENT (OUTPATIENT)
Dept: OUTPATIENT SERVICES | Facility: HOSPITAL | Age: 78
LOS: 1 days | End: 2018-07-05
Payer: MEDICARE

## 2018-07-05 DIAGNOSIS — M54.16 RADICULOPATHY, LUMBAR REGION: ICD-10-CM

## 2018-07-05 DIAGNOSIS — H25.89 OTHER AGE-RELATED CATARACT: Chronic | ICD-10-CM

## 2018-07-05 LAB — GLUCOSE BLDC GLUCOMTR-MCNC: 141 MG/DL — HIGH (ref 70–99)

## 2018-07-05 PROCEDURE — 77003 FLUOROGUIDE FOR SPINE INJECT: CPT

## 2018-07-05 PROCEDURE — 62323 NJX INTERLAMINAR LMBR/SAC: CPT

## 2018-07-05 PROCEDURE — 82962 GLUCOSE BLOOD TEST: CPT

## 2018-07-23 ENCOUNTER — APPOINTMENT (OUTPATIENT)
Dept: FAMILY MEDICINE | Facility: CLINIC | Age: 78
End: 2018-07-23

## 2018-09-09 ENCOUNTER — EMERGENCY (EMERGENCY)
Facility: HOSPITAL | Age: 78
LOS: 1 days | Discharge: HOME | End: 2018-09-09
Attending: EMERGENCY MEDICINE | Admitting: EMERGENCY MEDICINE
Payer: MEDICARE

## 2018-09-09 VITALS
HEIGHT: 64 IN | DIASTOLIC BLOOD PRESSURE: 82 MMHG | OXYGEN SATURATION: 99 % | TEMPERATURE: 98 F | WEIGHT: 199.96 LBS | SYSTOLIC BLOOD PRESSURE: 141 MMHG | HEART RATE: 80 BPM | RESPIRATION RATE: 18 BRPM

## 2018-09-09 DIAGNOSIS — R30.0 DYSURIA: ICD-10-CM

## 2018-09-09 DIAGNOSIS — H25.89 OTHER AGE-RELATED CATARACT: Chronic | ICD-10-CM

## 2018-09-09 LAB
ALBUMIN SERPL ELPH-MCNC: 3 G/DL — LOW (ref 3.3–5)
ALP SERPL-CCNC: 53 U/L — SIGNIFICANT CHANGE UP (ref 40–120)
ALT FLD-CCNC: 15 U/L DA — SIGNIFICANT CHANGE UP (ref 10–45)
ANION GAP SERPL CALC-SCNC: 13 MMOL/L — SIGNIFICANT CHANGE UP (ref 5–17)
APPEARANCE UR: ABNORMAL
AST SERPL-CCNC: 16 U/L — SIGNIFICANT CHANGE UP (ref 10–40)
BACTERIA # UR AUTO: NEGATIVE /HPF — SIGNIFICANT CHANGE UP
BILIRUB SERPL-MCNC: 0.5 MG/DL — SIGNIFICANT CHANGE UP (ref 0.2–1.2)
BILIRUB UR-MCNC: ABNORMAL
BUN SERPL-MCNC: 21 MG/DL — SIGNIFICANT CHANGE UP (ref 7–23)
CALCIUM SERPL-MCNC: 9.1 MG/DL — SIGNIFICANT CHANGE UP (ref 8.4–10.5)
CHLORIDE SERPL-SCNC: 105 MMOL/L — SIGNIFICANT CHANGE UP (ref 96–108)
CO2 SERPL-SCNC: 21 MMOL/L — LOW (ref 22–31)
COLOR SPEC: YELLOW — SIGNIFICANT CHANGE UP
CREAT SERPL-MCNC: 1.2 MG/DL — SIGNIFICANT CHANGE UP (ref 0.5–1.3)
DIFF PNL FLD: ABNORMAL
EPI CELLS # UR: SIGNIFICANT CHANGE UP
GLUCOSE SERPL-MCNC: 153 MG/DL — HIGH (ref 70–99)
GLUCOSE UR QL: NEGATIVE — SIGNIFICANT CHANGE UP
HCT VFR BLD CALC: 37.1 % — SIGNIFICANT CHANGE UP (ref 34.5–45)
HGB BLD-MCNC: 13.1 G/DL — SIGNIFICANT CHANGE UP (ref 11.5–15.5)
KETONES UR-MCNC: ABNORMAL
LEUKOCYTE ESTERASE UR-ACNC: ABNORMAL
MCHC RBC-ENTMCNC: 33.1 PG — SIGNIFICANT CHANGE UP (ref 27–34)
MCHC RBC-ENTMCNC: 35.3 GM/DL — SIGNIFICANT CHANGE UP (ref 32–36)
MCV RBC AUTO: 93.7 FL — SIGNIFICANT CHANGE UP (ref 80–100)
NITRITE UR-MCNC: NEGATIVE — SIGNIFICANT CHANGE UP
PH UR: 5 — SIGNIFICANT CHANGE UP (ref 5–8)
PLATELET # BLD AUTO: 176 K/UL — SIGNIFICANT CHANGE UP (ref 150–400)
POTASSIUM SERPL-MCNC: 4.1 MMOL/L — SIGNIFICANT CHANGE UP (ref 3.5–5.3)
POTASSIUM SERPL-SCNC: 4.1 MMOL/L — SIGNIFICANT CHANGE UP (ref 3.5–5.3)
PROT SERPL-MCNC: 7.2 G/DL — SIGNIFICANT CHANGE UP (ref 6–8.3)
PROT UR-MCNC: 30 MG/DL
RBC # BLD: 3.96 M/UL — SIGNIFICANT CHANGE UP (ref 3.8–5.2)
RBC # FLD: 11.6 % — SIGNIFICANT CHANGE UP (ref 10.3–14.5)
RBC CASTS # UR COMP ASSIST: >50 /HPF (ref 0–4)
SODIUM SERPL-SCNC: 139 MMOL/L — SIGNIFICANT CHANGE UP (ref 135–145)
SP GR SPEC: 1.01 — SIGNIFICANT CHANGE UP (ref 1.01–1.02)
UROBILINOGEN FLD QL: NEGATIVE — SIGNIFICANT CHANGE UP
WBC # BLD: 13.4 K/UL — HIGH (ref 3.8–10.5)
WBC # FLD AUTO: 13.4 K/UL — HIGH (ref 3.8–10.5)
WBC UR QL: >50 /HPF (ref 0–5)

## 2018-09-09 PROCEDURE — 87086 URINE CULTURE/COLONY COUNT: CPT

## 2018-09-09 PROCEDURE — 99284 EMERGENCY DEPT VISIT MOD MDM: CPT

## 2018-09-09 PROCEDURE — 96374 THER/PROPH/DIAG INJ IV PUSH: CPT

## 2018-09-09 PROCEDURE — 81001 URINALYSIS AUTO W/SCOPE: CPT

## 2018-09-09 PROCEDURE — 85027 COMPLETE CBC AUTOMATED: CPT

## 2018-09-09 PROCEDURE — 80053 COMPREHEN METABOLIC PANEL: CPT

## 2018-09-09 PROCEDURE — 99284 EMERGENCY DEPT VISIT MOD MDM: CPT | Mod: 25

## 2018-09-09 RX ORDER — SODIUM CHLORIDE 9 MG/ML
1000 INJECTION INTRAMUSCULAR; INTRAVENOUS; SUBCUTANEOUS ONCE
Qty: 0 | Refills: 0 | Status: COMPLETED | OUTPATIENT
Start: 2018-09-09 | End: 2018-09-09

## 2018-09-09 RX ORDER — CEFTRIAXONE 500 MG/1
1 INJECTION, POWDER, FOR SOLUTION INTRAMUSCULAR; INTRAVENOUS ONCE
Qty: 0 | Refills: 0 | Status: COMPLETED | OUTPATIENT
Start: 2018-09-09 | End: 2018-09-09

## 2018-09-09 RX ORDER — CEPHALEXIN 500 MG
1 CAPSULE ORAL
Qty: 14 | Refills: 0 | OUTPATIENT
Start: 2018-09-09 | End: 2018-09-15

## 2018-09-09 RX ADMIN — CEFTRIAXONE 100 GRAM(S): 500 INJECTION, POWDER, FOR SOLUTION INTRAMUSCULAR; INTRAVENOUS at 13:42

## 2018-09-09 RX ADMIN — SODIUM CHLORIDE 1000 MILLILITER(S): 9 INJECTION INTRAMUSCULAR; INTRAVENOUS; SUBCUTANEOUS at 12:58

## 2018-09-09 NOTE — ED ADULT NURSE NOTE - OBJECTIVE STATEMENT
78 year old female A&OX3, chief complaint urinary frequency, chills, but no pain. Patient states she has a history of UTI's and this is the ways she felt the last time. Patient states that she had difficulty a few days ago to produce urine but the symptoms subsided. Patient also states that yesterday the symptoms returned. Urine was obtained and sent to the lab along with blood work. Patient denies any other complaints.

## 2018-09-09 NOTE — ED PROVIDER NOTE - ATTENDING CONTRIBUTION TO CARE
Elvira with [facility]. Pt well appearing. C/O dysuria and frequency. It started few days ago and then improved but yesterday it worsening. Exam normal. Pt well appearing. Stable for discharge with outpt abx and PCP f/u. I performed a face to face bedside interview with patient regarding history of present illness, review of symptoms and past medical history. I completed an independent physical exam.  I have discussed the patient's plan of care with Physician Assistant (PA). I agree with note as stated above, having amended the EMR as needed to reflect my findings.   This includes History of Present Illness, HIV, Past Medical/Surgical/Family/Social History, Allergies and Home Medications, Review of Systems, Physical Exam, and any Progress Notes during the time I functioned as the attending physician for this patient.

## 2018-09-09 NOTE — ED ADULT NURSE NOTE - PMH
Hyperlipidemia, unspecified hyperlipidemia type    Hypertension, unspecified type    Incontinence in female    Type 2 diabetes mellitus without complication, unspecified whether long term insulin use

## 2018-09-09 NOTE — ED PROVIDER NOTE - OBJECTIVE STATEMENT
79 y/o F with 77 y/o F with DM, HLD, HTN presents to the ED with c/o dysuria, freq urination and chills x few days ago, initially improved but worsened yesterday. She denies fever, abd pain, hematuria, CP, SOB, palpitations or all other complaints.

## 2018-09-09 NOTE — ED ADULT NURSE NOTE - NSIMPLEMENTINTERV_GEN_ALL_ED
Implemented All Universal Safety Interventions:  [place] to call system. Call bell, personal items and telephone within reach. Instruct patient to call for assistance. Room bathroom lighting operational. Non-slip footwear when patient is off stretcher. Physically safe environment: no spills, clutter or unnecessary equipment. Stretcher in lowest position, wheels locked, appropriate side rails in place.

## 2018-09-09 NOTE — ED PROVIDER NOTE - MEDICAL DECISION MAKING DETAILS
77 y/o F with DM, HLD, HTN presents to the ED with c/o dysuria, freq urination and chills x few days ago, initially improved but worsened yesterday.  Plan: labs reviewed, wbc 13.4, UA results reviewed-- UTI noted. urine culture sent   patient discharged with antibiotics and instructions to f/u with her PCP

## 2018-09-10 LAB
CULTURE RESULTS: SIGNIFICANT CHANGE UP
SPECIMEN SOURCE: SIGNIFICANT CHANGE UP

## 2018-09-13 ENCOUNTER — APPOINTMENT (OUTPATIENT)
Dept: FAMILY MEDICINE | Facility: CLINIC | Age: 78
End: 2018-09-13
Payer: MEDICARE

## 2018-09-13 ENCOUNTER — RX RENEWAL (OUTPATIENT)
Age: 78
End: 2018-09-13

## 2018-09-13 VITALS
HEART RATE: 64 BPM | SYSTOLIC BLOOD PRESSURE: 160 MMHG | HEIGHT: 63 IN | TEMPERATURE: 98.1 F | DIASTOLIC BLOOD PRESSURE: 66 MMHG | OXYGEN SATURATION: 97 % | WEIGHT: 212 LBS | BODY MASS INDEX: 37.56 KG/M2

## 2018-09-13 VITALS — SYSTOLIC BLOOD PRESSURE: 148 MMHG | DIASTOLIC BLOOD PRESSURE: 72 MMHG

## 2018-09-13 PROBLEM — E78.5 HYPERLIPIDEMIA, UNSPECIFIED: Chronic | Status: ACTIVE | Noted: 2018-09-09

## 2018-09-13 PROBLEM — I10 ESSENTIAL (PRIMARY) HYPERTENSION: Chronic | Status: ACTIVE | Noted: 2018-09-09

## 2018-09-13 PROBLEM — R32 UNSPECIFIED URINARY INCONTINENCE: Chronic | Status: ACTIVE | Noted: 2018-09-09

## 2018-09-13 PROBLEM — E11.9 TYPE 2 DIABETES MELLITUS WITHOUT COMPLICATIONS: Chronic | Status: ACTIVE | Noted: 2018-09-09

## 2018-09-13 PROCEDURE — 99214 OFFICE O/P EST MOD 30 MIN: CPT

## 2018-09-13 NOTE — HISTORY OF PRESENT ILLNESS
[FreeTextEntry1] : cc: f/u ED visit UTI [de-identified] : Patient came to f/u ED visit 9/9/18 - chills, dysuria - was D/C on Keflex , now feels much better. Today pt denies fever, no chills, no abd pain, + postnasal drip - + cough, no wheezing, no SOB.

## 2018-09-13 NOTE — PHYSICAL EXAM
[No Acute Distress] : no acute distress [No Respiratory Distress] : no respiratory distress  [Clear to Auscultation] : lungs were clear to auscultation bilaterally [No Accessory Muscle Use] : no accessory muscle use [Normal Rate] : normal rate  [Regular Rhythm] : with a regular rhythm [Normal S1, S2] : normal S1 and S2 [No Murmur] : no murmur heard [No Edema] : there was no peripheral edema [Soft] : abdomen soft [Non Tender] : non-tender [Non-distended] : non-distended [No Masses] : no abdominal mass palpated [No HSM] : no HSM [Normal Bowel Sounds] : normal bowel sounds [Normal Gait] : normal gait

## 2018-09-27 ENCOUNTER — RX RENEWAL (OUTPATIENT)
Age: 78
End: 2018-09-27

## 2018-11-25 ENCOUNTER — INPATIENT (INPATIENT)
Facility: HOSPITAL | Age: 78
LOS: 3 days | Discharge: HOME | DRG: 388 | End: 2018-11-29
Attending: HOSPITALIST | Admitting: INTERNAL MEDICINE
Payer: MEDICARE

## 2018-11-25 VITALS
TEMPERATURE: 98 F | RESPIRATION RATE: 20 BRPM | WEIGHT: 199.96 LBS | HEART RATE: 130 BPM | SYSTOLIC BLOOD PRESSURE: 66 MMHG | DIASTOLIC BLOOD PRESSURE: 42 MMHG | HEIGHT: 64 IN | OXYGEN SATURATION: 97 %

## 2018-11-25 DIAGNOSIS — I10 ESSENTIAL (PRIMARY) HYPERTENSION: ICD-10-CM

## 2018-11-25 DIAGNOSIS — H25.89 OTHER AGE-RELATED CATARACT: Chronic | ICD-10-CM

## 2018-11-25 DIAGNOSIS — K56.600 PARTIAL INTESTINAL OBSTRUCTION, UNSPECIFIED AS TO CAUSE: ICD-10-CM

## 2018-11-25 DIAGNOSIS — Z29.9 ENCOUNTER FOR PROPHYLACTIC MEASURES, UNSPECIFIED: ICD-10-CM

## 2018-11-25 DIAGNOSIS — Z87.19 PERSONAL HISTORY OF OTHER DISEASES OF THE DIGESTIVE SYSTEM: Chronic | ICD-10-CM

## 2018-11-25 DIAGNOSIS — N39.0 URINARY TRACT INFECTION, SITE NOT SPECIFIED: ICD-10-CM

## 2018-11-25 DIAGNOSIS — E78.5 HYPERLIPIDEMIA, UNSPECIFIED: ICD-10-CM

## 2018-11-25 DIAGNOSIS — Z98.890 OTHER SPECIFIED POSTPROCEDURAL STATES: Chronic | ICD-10-CM

## 2018-11-25 DIAGNOSIS — E86.0 DEHYDRATION: ICD-10-CM

## 2018-11-25 DIAGNOSIS — E11.9 TYPE 2 DIABETES MELLITUS WITHOUT COMPLICATIONS: ICD-10-CM

## 2018-11-25 DIAGNOSIS — E87.2 ACIDOSIS: ICD-10-CM

## 2018-11-25 DIAGNOSIS — N17.9 ACUTE KIDNEY FAILURE, UNSPECIFIED: ICD-10-CM

## 2018-11-25 LAB
ALBUMIN SERPL ELPH-MCNC: 3.2 G/DL — LOW (ref 3.3–5)
ALP SERPL-CCNC: 51 U/L — SIGNIFICANT CHANGE UP (ref 40–120)
ALT FLD-CCNC: 21 U/L DA — SIGNIFICANT CHANGE UP (ref 10–45)
ANION GAP SERPL CALC-SCNC: 13 MMOL/L — SIGNIFICANT CHANGE UP (ref 5–17)
ANION GAP SERPL CALC-SCNC: 14 MMOL/L — SIGNIFICANT CHANGE UP (ref 5–17)
ANION GAP SERPL CALC-SCNC: 22 MMOL/L — HIGH (ref 5–17)
APPEARANCE UR: SIGNIFICANT CHANGE UP
APTT BLD: 29.9 SEC — SIGNIFICANT CHANGE UP (ref 27.5–36.3)
AST SERPL-CCNC: 19 U/L — SIGNIFICANT CHANGE UP (ref 10–40)
BACTERIA # UR AUTO: ABNORMAL /HPF
BASOPHILS # BLD AUTO: 0.1 K/UL — SIGNIFICANT CHANGE UP (ref 0–0.2)
BASOPHILS NFR BLD AUTO: 1 % — SIGNIFICANT CHANGE UP (ref 0–2)
BILIRUB SERPL-MCNC: 0.6 MG/DL — SIGNIFICANT CHANGE UP (ref 0.2–1.2)
BILIRUB UR-MCNC: ABNORMAL
BLD GP AB SCN SERPL QL: SIGNIFICANT CHANGE UP
BUN SERPL-MCNC: 38 MG/DL — HIGH (ref 7–23)
BUN SERPL-MCNC: 42 MG/DL — HIGH (ref 7–23)
BUN SERPL-MCNC: 42 MG/DL — HIGH (ref 7–23)
CALCIUM SERPL-MCNC: 7.6 MG/DL — LOW (ref 8.4–10.5)
CALCIUM SERPL-MCNC: 7.8 MG/DL — LOW (ref 8.4–10.5)
CALCIUM SERPL-MCNC: 9.4 MG/DL — SIGNIFICANT CHANGE UP (ref 8.4–10.5)
CHLORIDE SERPL-SCNC: 104 MMOL/L — SIGNIFICANT CHANGE UP (ref 96–108)
CHLORIDE SERPL-SCNC: 105 MMOL/L — SIGNIFICANT CHANGE UP (ref 96–108)
CHLORIDE SERPL-SCNC: 98 MMOL/L — SIGNIFICANT CHANGE UP (ref 96–108)
CO2 SERPL-SCNC: 15 MMOL/L — LOW (ref 22–31)
CO2 SERPL-SCNC: 18 MMOL/L — LOW (ref 22–31)
CO2 SERPL-SCNC: 19 MMOL/L — LOW (ref 22–31)
COLOR SPEC: YELLOW — SIGNIFICANT CHANGE UP
COMMENT - URINE: SIGNIFICANT CHANGE UP
CREAT SERPL-MCNC: 1.23 MG/DL — SIGNIFICANT CHANGE UP (ref 0.5–1.3)
CREAT SERPL-MCNC: 1.64 MG/DL — HIGH (ref 0.5–1.3)
CREAT SERPL-MCNC: 2.16 MG/DL — HIGH (ref 0.5–1.3)
DIFF PNL FLD: NEGATIVE — SIGNIFICANT CHANGE UP
EOSINOPHIL # BLD AUTO: 0 K/UL — SIGNIFICANT CHANGE UP (ref 0–0.5)
EOSINOPHIL NFR BLD AUTO: 0 % — SIGNIFICANT CHANGE UP (ref 0–6)
EPI CELLS # UR: ABNORMAL
GLUCOSE BLDC GLUCOMTR-MCNC: 112 MG/DL — HIGH (ref 70–99)
GLUCOSE BLDC GLUCOMTR-MCNC: 197 MG/DL — HIGH (ref 70–99)
GLUCOSE BLDC GLUCOMTR-MCNC: 312 MG/DL — HIGH (ref 70–99)
GLUCOSE SERPL-MCNC: 129 MG/DL — HIGH (ref 70–99)
GLUCOSE SERPL-MCNC: 193 MG/DL — HIGH (ref 70–99)
GLUCOSE SERPL-MCNC: 355 MG/DL — HIGH (ref 70–99)
GLUCOSE UR QL: NEGATIVE — SIGNIFICANT CHANGE UP
HCT VFR BLD CALC: 38.4 % — SIGNIFICANT CHANGE UP (ref 34.5–45)
HCT VFR BLD CALC: 46.6 % — HIGH (ref 34.5–45)
HGB BLD-MCNC: 12.8 G/DL — SIGNIFICANT CHANGE UP (ref 11.5–15.5)
HGB BLD-MCNC: 15.7 G/DL — HIGH (ref 11.5–15.5)
INR BLD: 1.29 RATIO — HIGH (ref 0.88–1.16)
KETONES UR-MCNC: ABNORMAL
LACTATE SERPL-SCNC: 1.6 MMOL/L — SIGNIFICANT CHANGE UP (ref 0.7–2)
LACTATE SERPL-SCNC: 2.9 MMOL/L — HIGH (ref 0.7–2)
LACTATE SERPL-SCNC: 4.6 MMOL/L — CRITICAL HIGH (ref 0.7–2)
LACTATE SERPL-SCNC: 7.1 MMOL/L — CRITICAL HIGH (ref 0.7–2)
LEUKOCYTE ESTERASE UR-ACNC: ABNORMAL
LIDOCAIN IGE QN: 64 U/L — LOW (ref 73–393)
LYMPHOCYTES # BLD AUTO: 1.1 K/UL — SIGNIFICANT CHANGE UP (ref 1–3.3)
LYMPHOCYTES # BLD AUTO: 14.4 % — SIGNIFICANT CHANGE UP (ref 13–44)
MAGNESIUM SERPL-MCNC: 1.1 MG/DL — LOW (ref 1.6–2.6)
MAGNESIUM SERPL-MCNC: 2.1 MG/DL — SIGNIFICANT CHANGE UP (ref 1.6–2.6)
MCHC RBC-ENTMCNC: 31.4 PG — SIGNIFICANT CHANGE UP (ref 27–34)
MCHC RBC-ENTMCNC: 31.4 PG — SIGNIFICANT CHANGE UP (ref 27–34)
MCHC RBC-ENTMCNC: 33.3 GM/DL — SIGNIFICANT CHANGE UP (ref 32–36)
MCHC RBC-ENTMCNC: 33.8 GM/DL — SIGNIFICANT CHANGE UP (ref 32–36)
MCV RBC AUTO: 93.1 FL — SIGNIFICANT CHANGE UP (ref 80–100)
MCV RBC AUTO: 94.4 FL — SIGNIFICANT CHANGE UP (ref 80–100)
MONOCYTES # BLD AUTO: 1.4 K/UL — HIGH (ref 0–0.9)
MONOCYTES NFR BLD AUTO: 19.6 % — HIGH (ref 2–14)
NEUTROPHILS # BLD AUTO: 4.7 K/UL — SIGNIFICANT CHANGE UP (ref 1.8–7.4)
NEUTROPHILS NFR BLD AUTO: 65 % — SIGNIFICANT CHANGE UP (ref 43–77)
NITRITE UR-MCNC: NEGATIVE — SIGNIFICANT CHANGE UP
PH UR: 6 — SIGNIFICANT CHANGE UP (ref 5–8)
PHOSPHATE SERPL-MCNC: 2.8 MG/DL — SIGNIFICANT CHANGE UP (ref 2.5–4.5)
PLATELET # BLD AUTO: 203 K/UL — SIGNIFICANT CHANGE UP (ref 150–400)
PLATELET # BLD AUTO: 309 K/UL — SIGNIFICANT CHANGE UP (ref 150–400)
POTASSIUM SERPL-MCNC: 3.2 MMOL/L — LOW (ref 3.5–5.3)
POTASSIUM SERPL-MCNC: 3.5 MMOL/L — SIGNIFICANT CHANGE UP (ref 3.5–5.3)
POTASSIUM SERPL-MCNC: 3.8 MMOL/L — SIGNIFICANT CHANGE UP (ref 3.5–5.3)
POTASSIUM SERPL-SCNC: 3.2 MMOL/L — LOW (ref 3.5–5.3)
POTASSIUM SERPL-SCNC: 3.5 MMOL/L — SIGNIFICANT CHANGE UP (ref 3.5–5.3)
POTASSIUM SERPL-SCNC: 3.8 MMOL/L — SIGNIFICANT CHANGE UP (ref 3.5–5.3)
PROT SERPL-MCNC: 8.1 G/DL — SIGNIFICANT CHANGE UP (ref 6–8.3)
PROT UR-MCNC: 30 MG/DL
PROTHROM AB SERPL-ACNC: 14.6 SEC — HIGH (ref 10–12.9)
RBC # BLD: 4.07 M/UL — SIGNIFICANT CHANGE UP (ref 3.8–5.2)
RBC # BLD: 5 M/UL — SIGNIFICANT CHANGE UP (ref 3.8–5.2)
RBC # FLD: 11.8 % — SIGNIFICANT CHANGE UP (ref 10.3–14.5)
RBC # FLD: 12 % — SIGNIFICANT CHANGE UP (ref 10.3–14.5)
RBC CASTS # UR COMP ASSIST: SIGNIFICANT CHANGE UP /HPF (ref 0–4)
SODIUM SERPL-SCNC: 135 MMOL/L — SIGNIFICANT CHANGE UP (ref 135–145)
SODIUM SERPL-SCNC: 136 MMOL/L — SIGNIFICANT CHANGE UP (ref 135–145)
SODIUM SERPL-SCNC: 137 MMOL/L — SIGNIFICANT CHANGE UP (ref 135–145)
SP GR SPEC: 1.01 — SIGNIFICANT CHANGE UP (ref 1.01–1.02)
TROPONIN I SERPL-MCNC: <.017 NG/ML — LOW (ref 0.02–0.06)
UROBILINOGEN FLD QL: NEGATIVE — SIGNIFICANT CHANGE UP
WBC # BLD: 5.4 K/UL — SIGNIFICANT CHANGE UP (ref 3.8–10.5)
WBC # BLD: 7.3 K/UL — SIGNIFICANT CHANGE UP (ref 3.8–10.5)
WBC # FLD AUTO: 5.4 K/UL — SIGNIFICANT CHANGE UP (ref 3.8–10.5)
WBC # FLD AUTO: 7.3 K/UL — SIGNIFICANT CHANGE UP (ref 3.8–10.5)
WBC UR QL: SIGNIFICANT CHANGE UP /HPF (ref 0–5)

## 2018-11-25 PROCEDURE — 74176 CT ABD & PELVIS W/O CONTRAST: CPT | Mod: 26

## 2018-11-25 PROCEDURE — 99291 CRITICAL CARE FIRST HOUR: CPT

## 2018-11-25 PROCEDURE — 93010 ELECTROCARDIOGRAM REPORT: CPT

## 2018-11-25 PROCEDURE — 71045 X-RAY EXAM CHEST 1 VIEW: CPT | Mod: 26

## 2018-11-25 RX ORDER — SODIUM CHLORIDE 9 MG/ML
1000 INJECTION, SOLUTION INTRAVENOUS
Qty: 0 | Refills: 0 | Status: DISCONTINUED | OUTPATIENT
Start: 2018-11-25 | End: 2018-11-25

## 2018-11-25 RX ORDER — MAGNESIUM SULFATE 500 MG/ML
1 VIAL (ML) INJECTION
Qty: 0 | Refills: 0 | Status: COMPLETED | OUTPATIENT
Start: 2018-11-25 | End: 2018-11-25

## 2018-11-25 RX ORDER — INSULIN HUMAN 100 [IU]/ML
6 INJECTION, SOLUTION SUBCUTANEOUS ONCE
Qty: 0 | Refills: 0 | Status: COMPLETED | OUTPATIENT
Start: 2018-11-25 | End: 2018-11-25

## 2018-11-25 RX ORDER — GLUCAGON INJECTION, SOLUTION 0.5 MG/.1ML
1 INJECTION, SOLUTION SUBCUTANEOUS ONCE
Qty: 0 | Refills: 0 | Status: DISCONTINUED | OUTPATIENT
Start: 2018-11-25 | End: 2018-11-29

## 2018-11-25 RX ORDER — INSULIN LISPRO 100/ML
VIAL (ML) SUBCUTANEOUS EVERY 6 HOURS
Qty: 0 | Refills: 0 | Status: DISCONTINUED | OUTPATIENT
Start: 2018-11-25 | End: 2018-11-26

## 2018-11-25 RX ORDER — ACETAMINOPHEN 500 MG
975 TABLET ORAL ONCE
Qty: 0 | Refills: 0 | Status: COMPLETED | OUTPATIENT
Start: 2018-11-25 | End: 2018-11-25

## 2018-11-25 RX ORDER — SODIUM CHLORIDE 9 MG/ML
1000 INJECTION, SOLUTION INTRAVENOUS
Qty: 0 | Refills: 0 | Status: DISCONTINUED | OUTPATIENT
Start: 2018-11-25 | End: 2018-11-28

## 2018-11-25 RX ORDER — PANTOPRAZOLE SODIUM 20 MG/1
40 TABLET, DELAYED RELEASE ORAL ONCE
Qty: 0 | Refills: 0 | Status: COMPLETED | OUTPATIENT
Start: 2018-11-25 | End: 2018-11-25

## 2018-11-25 RX ORDER — DILTIAZEM HCL 120 MG
10 CAPSULE, EXT RELEASE 24 HR ORAL
Qty: 125 | Refills: 0 | Status: DISCONTINUED | OUTPATIENT
Start: 2018-11-25 | End: 2018-11-26

## 2018-11-25 RX ORDER — SODIUM CHLORIDE 9 MG/ML
3 INJECTION INTRAMUSCULAR; INTRAVENOUS; SUBCUTANEOUS ONCE
Qty: 0 | Refills: 0 | Status: COMPLETED | OUTPATIENT
Start: 2018-11-25 | End: 2018-11-25

## 2018-11-25 RX ORDER — POTASSIUM PHOSPHATE, MONOBASIC POTASSIUM PHOSPHATE, DIBASIC 236; 224 MG/ML; MG/ML
30 INJECTION, SOLUTION INTRAVENOUS ONCE
Qty: 0 | Refills: 0 | Status: COMPLETED | OUTPATIENT
Start: 2018-11-25 | End: 2018-11-26

## 2018-11-25 RX ORDER — SODIUM CHLORIDE 9 MG/ML
1000 INJECTION, SOLUTION INTRAVENOUS ONCE
Qty: 0 | Refills: 0 | Status: COMPLETED | OUTPATIENT
Start: 2018-11-25 | End: 2018-11-25

## 2018-11-25 RX ORDER — HEPARIN SODIUM 5000 [USP'U]/ML
5000 INJECTION INTRAVENOUS; SUBCUTANEOUS EVERY 8 HOURS
Qty: 0 | Refills: 0 | Status: DISCONTINUED | OUTPATIENT
Start: 2018-11-25 | End: 2018-11-29

## 2018-11-25 RX ORDER — DIPHENHYDRAMINE HCL 50 MG
50 CAPSULE ORAL ONCE
Qty: 0 | Refills: 0 | Status: COMPLETED | OUTPATIENT
Start: 2018-11-25 | End: 2018-11-25

## 2018-11-25 RX ORDER — DEXTROSE 50 % IN WATER 50 %
12.5 SYRINGE (ML) INTRAVENOUS ONCE
Qty: 0 | Refills: 0 | Status: DISCONTINUED | OUTPATIENT
Start: 2018-11-25 | End: 2018-11-29

## 2018-11-25 RX ORDER — CEFTRIAXONE 500 MG/1
1 INJECTION, POWDER, FOR SOLUTION INTRAMUSCULAR; INTRAVENOUS EVERY 24 HOURS
Qty: 0 | Refills: 0 | Status: DISCONTINUED | OUTPATIENT
Start: 2018-11-26 | End: 2018-11-27

## 2018-11-25 RX ORDER — SODIUM CHLORIDE 9 MG/ML
2800 INJECTION INTRAMUSCULAR; INTRAVENOUS; SUBCUTANEOUS ONCE
Qty: 0 | Refills: 0 | Status: COMPLETED | OUTPATIENT
Start: 2018-11-25 | End: 2018-11-25

## 2018-11-25 RX ORDER — DEXTROSE 50 % IN WATER 50 %
25 SYRINGE (ML) INTRAVENOUS ONCE
Qty: 0 | Refills: 0 | Status: DISCONTINUED | OUTPATIENT
Start: 2018-11-25 | End: 2018-11-29

## 2018-11-25 RX ORDER — CEFTRIAXONE 500 MG/1
INJECTION, POWDER, FOR SOLUTION INTRAMUSCULAR; INTRAVENOUS
Qty: 0 | Refills: 0 | Status: DISCONTINUED | OUTPATIENT
Start: 2018-11-25 | End: 2018-11-27

## 2018-11-25 RX ORDER — MAGNESIUM SULFATE 500 MG/ML
2 VIAL (ML) INJECTION
Qty: 0 | Refills: 0 | Status: DISCONTINUED | OUTPATIENT
Start: 2018-11-25 | End: 2018-11-25

## 2018-11-25 RX ORDER — SODIUM CHLORIDE 9 MG/ML
1000 INJECTION, SOLUTION INTRAVENOUS
Qty: 0 | Refills: 0 | Status: DISCONTINUED | OUTPATIENT
Start: 2018-11-25 | End: 2018-11-29

## 2018-11-25 RX ORDER — CEFTRIAXONE 500 MG/1
1 INJECTION, POWDER, FOR SOLUTION INTRAMUSCULAR; INTRAVENOUS ONCE
Qty: 0 | Refills: 0 | Status: COMPLETED | OUTPATIENT
Start: 2018-11-25 | End: 2018-11-25

## 2018-11-25 RX ORDER — AZTREONAM 2 G
2000 VIAL (EA) INJECTION ONCE
Qty: 0 | Refills: 0 | Status: COMPLETED | OUTPATIENT
Start: 2018-11-25 | End: 2018-11-25

## 2018-11-25 RX ORDER — ONDANSETRON 8 MG/1
4 TABLET, FILM COATED ORAL ONCE
Qty: 0 | Refills: 0 | Status: COMPLETED | OUTPATIENT
Start: 2018-11-25 | End: 2018-11-25

## 2018-11-25 RX ORDER — ACETAMINOPHEN 500 MG
650 TABLET ORAL EVERY 6 HOURS
Qty: 0 | Refills: 0 | Status: DISCONTINUED | OUTPATIENT
Start: 2018-11-25 | End: 2018-11-29

## 2018-11-25 RX ORDER — PANTOPRAZOLE SODIUM 20 MG/1
40 TABLET, DELAYED RELEASE ORAL DAILY
Qty: 0 | Refills: 0 | Status: DISCONTINUED | OUTPATIENT
Start: 2018-11-25 | End: 2018-11-27

## 2018-11-25 RX ORDER — CALCIUM GLUCONATE 100 MG/ML
1 VIAL (ML) INTRAVENOUS ONCE
Qty: 0 | Refills: 0 | Status: COMPLETED | OUTPATIENT
Start: 2018-11-25 | End: 2018-11-25

## 2018-11-25 RX ORDER — DEXTROSE 50 % IN WATER 50 %
15 SYRINGE (ML) INTRAVENOUS ONCE
Qty: 0 | Refills: 0 | Status: DISCONTINUED | OUTPATIENT
Start: 2018-11-25 | End: 2018-11-29

## 2018-11-25 RX ADMIN — HEPARIN SODIUM 5000 UNIT(S): 5000 INJECTION INTRAVENOUS; SUBCUTANEOUS at 22:26

## 2018-11-25 RX ADMIN — HEPARIN SODIUM 5000 UNIT(S): 5000 INJECTION INTRAVENOUS; SUBCUTANEOUS at 14:44

## 2018-11-25 RX ADMIN — SODIUM CHLORIDE 999 MILLILITER(S): 9 INJECTION, SOLUTION INTRAVENOUS at 16:00

## 2018-11-25 RX ADMIN — INSULIN HUMAN 6 UNIT(S): 100 INJECTION, SOLUTION SUBCUTANEOUS at 11:45

## 2018-11-25 RX ADMIN — Medication 650 MILLIGRAM(S): at 19:00

## 2018-11-25 RX ADMIN — SODIUM CHLORIDE 75 MILLILITER(S): 9 INJECTION, SOLUTION INTRAVENOUS at 14:25

## 2018-11-25 RX ADMIN — Medication 100 GRAM(S): at 19:46

## 2018-11-25 RX ADMIN — PANTOPRAZOLE SODIUM 40 MILLIGRAM(S): 20 TABLET, DELAYED RELEASE ORAL at 09:25

## 2018-11-25 RX ADMIN — ONDANSETRON 4 MILLIGRAM(S): 8 TABLET, FILM COATED ORAL at 09:20

## 2018-11-25 RX ADMIN — Medication 50 MILLIGRAM(S): at 23:26

## 2018-11-25 RX ADMIN — Medication 10 MG/HR: at 17:30

## 2018-11-25 RX ADMIN — Medication 100 MILLIGRAM(S): at 09:27

## 2018-11-25 RX ADMIN — Medication 2: at 18:19

## 2018-11-25 RX ADMIN — Medication 100 GRAM(S): at 18:46

## 2018-11-25 RX ADMIN — Medication 975 MILLIGRAM(S): at 09:57

## 2018-11-25 RX ADMIN — SODIUM CHLORIDE 2000 MILLILITER(S): 9 INJECTION, SOLUTION INTRAVENOUS at 20:59

## 2018-11-25 RX ADMIN — Medication 200 GRAM(S): at 17:45

## 2018-11-25 RX ADMIN — Medication 100 GRAM(S): at 17:45

## 2018-11-25 RX ADMIN — Medication 2000 MILLIGRAM(S): at 10:19

## 2018-11-25 RX ADMIN — SODIUM CHLORIDE 125 MILLILITER(S): 9 INJECTION, SOLUTION INTRAVENOUS at 20:59

## 2018-11-25 RX ADMIN — Medication 650 MILLIGRAM(S): at 20:00

## 2018-11-25 RX ADMIN — Medication 5 MG/HR: at 16:28

## 2018-11-25 RX ADMIN — SODIUM CHLORIDE 2800 MILLILITER(S): 9 INJECTION INTRAMUSCULAR; INTRAVENOUS; SUBCUTANEOUS at 09:15

## 2018-11-25 RX ADMIN — PANTOPRAZOLE SODIUM 40 MILLIGRAM(S): 20 TABLET, DELAYED RELEASE ORAL at 14:45

## 2018-11-25 RX ADMIN — Medication 100 GRAM(S): at 16:47

## 2018-11-25 RX ADMIN — CEFTRIAXONE 100 GRAM(S): 500 INJECTION, POWDER, FOR SOLUTION INTRAMUSCULAR; INTRAVENOUS at 14:45

## 2018-11-25 RX ADMIN — SODIUM CHLORIDE 3 MILLILITER(S): 9 INJECTION INTRAMUSCULAR; INTRAVENOUS; SUBCUTANEOUS at 09:15

## 2018-11-25 RX ADMIN — SODIUM CHLORIDE 2800 MILLILITER(S): 9 INJECTION INTRAMUSCULAR; INTRAVENOUS; SUBCUTANEOUS at 10:30

## 2018-11-25 NOTE — ED PROVIDER NOTE - CRITICAL CARE PROVIDED
direct patient care (not related to procedure)/consult w/ pt's family directly relating to pts condition/documentation/additional history taking/interpretation of diagnostic studies/consultation with other physicians/telephone consultation with the patient's family

## 2018-11-25 NOTE — PATIENT PROFILE ADULT - ANY IMPAIRED UPPER EXTREMITY FUNCTION WITHIN A WEEK PRIOR TO ADMISSION RELATED TO?
"Chief Complaint   Patient presents with     Pain       Initial /88  Pulse 74  Wt 104.8 kg (231 lb)  BMI 33.15 kg/m2 Estimated body mass index is 33.15 kg/(m^2) as calculated from the following:    Height as of 2/15/17: 1.778 m (5' 10\").    Weight as of this encounter: 104.8 kg (231 lb).  Medication Reconciliation: aaron Durbin CMA (AAMA)      "
no

## 2018-11-25 NOTE — H&P ADULT - PROBLEM SELECTOR PLAN 8
Now normotensive likely due to hypovolemia, hold antihypertensives and monitor vial signs, restart as tolerated

## 2018-11-25 NOTE — PROGRESS NOTE ADULT - SUBJECTIVE AND OBJECTIVE BOX
Patient is a 78y old  Female who presents with a chief complaint of Abdominal pain (2018 14:06)      BRIEF HOSPITAL COURSE:   Patient is a 77 yo female pmhx HTN, DM on metformin, multiple abdominal surgeries and HLD presented from home with complaints of abdominal pain x3 days.  As per patient she has had multiple abdominal surgeries and typically about 2 times a year she experiences abdominal pain with constipation however this time it did not dissipate.        PAST MEDICAL & SURGICAL HISTORY:  Type 2 diabetes mellitus without complication, unspecified whether long term insulin use  Incontinence in female  Hyperlipidemia, unspecified hyperlipidemia type  Hypertension, unspecified type  Other age-related cataract of both eyes  H/O inguinal hernia  H/O umbilical hernia repair      Allergies  No Known Allergies      FAMILY HISTORY:  No pertinent family history in first degree relatives      Social History:   From home.       Review of Systems:  See Brief Hospital Course      Vitals During Exam:   HR:   BP:   RR:  sPO2:     Physical Examination:    General: No acute distress.      HEENT: Pupils equal, reactive to light.  Symmetric.    PULM: Clear to auscultation bilaterally, no significant sputum production    CVS: Regular rate and rhythm, no murmurs, rubs, or gallops    ABD: Soft, nondistended, nontender, normoactive bowel sounds, no masses    EXT: No edema, nontender    SKIN: Warm and well perfused, no rashes noted.    NEURO: Alert, oriented, interactive, nonfocal      Medications:  cefTRIAXone   IVPB      diltiazem Infusion 10 mG/Hr IV Continuous <Continuous>  acetaminophen  Suppository .. 650 milliGRAM(s) Rectal every 6 hours PRN  heparin  Injectable 5000 Unit(s) SubCutaneous every 8 hours  pantoprazole  Injectable 40 milliGRAM(s) IV Push daily  dextrose 40% Gel 15 Gram(s) Oral once PRN  dextrose 50% Injectable 12.5 Gram(s) IV Push once  dextrose 50% Injectable 25 Gram(s) IV Push once  dextrose 50% Injectable 25 Gram(s) IV Push once  glucagon  Injectable 1 milliGRAM(s) IntraMuscular once PRN  insulin lispro (HumaLOG) corrective regimen sliding scale   SubCutaneous every 6 hours  dextrose 5%. 1000 milliLiter(s) IV Continuous <Continuous>  lactated ringers Bolus 1000 milliLiter(s) IV Bolus once  lactated ringers. 1000 milliLiter(s) IV Continuous <Continuous>  magnesium sulfate  IVPB 1 Gram(s) IV Intermittent every 1 hour      ICU Vital Signs Last 24 Hrs  T(C): 38.8 (2018 18:00), Max: 38.8 (2018 18:00)  T(F): 101.8 (2018 18:00), Max: 101.8 (2018 18:00)  HR: 111 (2018 19:00) (92 - 135)  BP: 94/59 (2018 19:00) (66/42 - 129/69)  BP(mean): 70 (:) (70 - 80)  ABP: --  ABP(mean): --  RR: 33 (:00) (20 - 36)  SpO2: 96% (:) (94% - 98%)    Vital Signs Last 24 Hrs  T(C): 38.8 (2018 18:00), Max: 38.8 (2018 18:00)  T(F): 101.8 (2018 18:00), Max: 101.8 (2018 18:00)  HR: 111 (:) (92 - 135)  BP: 94/59 (2018 19:00) (66/42 - 129/69)  BP(mean): 70 (:00) (70 - 80)  RR: 33 (:00) (20 - 36)  SpO2: 96% (:00) (94% - 98%)      I&O's Detail    2018 07:01  -  2018 19:38  --------------------------------------------------------  IN:    diltiazem Infusion: 5 mL    diltiazem Infusion: 30 mL    IV PiggyBack: 50 mL    lactated ringers.: 2000 mL    lactated ringers.: 300 mL    Solution: 100 mL    Solution: 300 mL  Total IN: 2785 mL    OUT:    Voided: 550 mL  Total OUT: 550 mL  Total NET: 2235 mL      LABS:                        12.8   5.4   )-----------( 203      ( 2018 14:40 )             38.4         137  |  104  |  42<H>  ----------------------------<  193<H>  3.5   |  19<L>  |  1.64<H>    Ca    7.6<L>      2018 14:40  Mg     1.1         TPro  8.1  /  Alb  3.2<L>  /  TBili  0.6  /  DBili  x   /  AST  19  /  ALT  21  /  AlkPhos  51        CARDIAC MARKERS ( 2018 15:00 )  <.017 ng/mL / x     / x     / x     / x          CAPILLARY BLOOD GLUCOSE  POCT Blood Glucose.: 197 mg/dL (2018 18:13)      PT/INR - ( 2018 09:10 )   PT: 14.6 sec;   INR: 1.29 ratio    PTT - ( 2018 09:10 )  PTT:29.9 sec      Urinalysis Basic - ( 2018 09:10 )  Color: Yellow / Appearance: slightly cloudy / S.015 / pH: x  Gluc: x / Ketone: Trace  / Bili: Moderate / Urobili: Negative   Blood: x / Protein: 30 mg/dL / Nitrite: Negative   Leuk Esterase: Trace / RBC: 0-4 /HPF / WBC 0-2 /HPF   Sq Epi: x / Non Sq Epi: Moderate / Bacteria: Moderate /HPF      CULTURES:  Cultures pending.       RADIOLOGY:   < from: CT Abdomen and Pelvis w/ Oral Cont (18 @ 11:22) >  EXAM:  CT ABDOMEN AND PELVIS OC      PROCEDURE DATE:  2018        INTERPRETATION:  Exam Type:  CT ABDOMEN AND PELVIS OC   Date and Time: 2018 11:22 AM  Indication: Abdominal pain and distention  Compared to: 2015  Technique: CT of the ABDOMEN and PELVIS:  No intravenous contrast was   administered at physician request. This limits sensitivity for certain   processes. Oral contrast was  administered.    COMMENTS:    LOWER LUNGS AND PLEURA: Coronary vascular calcification. Thickening of   the distal esophagus, correlate for esophagitis.     LIVER: within normal limits.  BILE DUCTS: normal caliber.  GALLBLADDER: Fundal gallstones and/or gallbladder calcification.           PANCREAS: within normal limits.  SPLEEN: within normallimits.  ADRENALS: Indeterminate left adrenal lesion unchanged. Right adrenal   gland is unremarkable.    KIDNEYS, URETERS AND BLADDER: Mild bilateral renal atrophy and renal   cortical scarring. No hydronephrosis. Nonspecific bilateral perinephric   stranding and trace perinephric fluid. Nonobstructive anterior left lower   pole intrarenal calculus. The ureters are unremarkable. The bladder is   within normal limits.    PELVIS: Trace pelvic free fluid. No adnexal masses. Subcentimeter pelvic   lymph nodes.       BOWEL: Question nodularity along the second portion of the duodenum,   nonspecific in nature, upper endoscopy can be obtained for further   evaluation.. Colonic diverticulosis without definite evidence of   diverticulitis. Diffusely dilated fluid-filled small and large bowel to   the level of the splenic flexor. No definite evidence of transition zone   in the small or large bowel. Mildly distended descending colon sigmoid   colon and rectum with fluid and stool. Findings may represent a   nonspecific ileus versus ileal colitis versus unclear etiology. A mild   stricture of the proximal descending colon cannot entirely be excluded.  PERITONEUM: no ascites or free air, no fluid collection.  RETROPERITONEUM: within normal limits.      VESSELS: atherosclerotic changes.      ABDOMINAL WALL: Marked weakness of the anterior abdominal wall with   protrusion of small large bowel anteriorly.  BONES: Degenerative changes. Grade 2 anterolisthesis of L5 on S1 with   bilateral spondylolysis at L5.    IMPRESSION:     Diffusely dilated fluid-filled small and large bowel to the level of the   splenic flexor. No definite evidence of transition zone in the small or   large bowel. Mildly distended descending colon sigmoid colon and rectum   with fluid and stool. Findings may represent a nonspecific ileus versus   ileal colitis versus unclear etiology. A mild stricture of the proximal   descending colon cannot entirely be excluded.    Thickening of the distal esophagus, correlate foresophagitis    Question nodularity along the second portion of the duodenum, nonspecific   in nature, upper endoscopy can be obtained for further evaluation    Colonic diverticulosis without definite diverticulitis.    < end of copied text >      < from: Xray Chest 1 View-PORTABLE IMMEDIATE (18 @ 09:47) >  EXAM:  XR CHEST PORTABLE IMMED 1V      PROCEDURE DATE:  2018        INTERPRETATION:  CLINICAL STATEMENT: Chest Pain.    TECHNIQUE: AP view of the chest.    COMPARISON: 3/12/2013    FINDINGS/  IMPRESSION:  No gross consolidation or pleural effusion    Heart size cannot be accurately assessed in this projection.    < end of copied text >      SUPPLEMENTAL O2:  N  LINES: Peripheral   NEWELL: N  PPx: Heparin  CONTACT: N Patient is a 78y old  Female who presents with a chief complaint of Abdominal pain (2018 14:06)      BRIEF HOSPITAL COURSE:   Patient is a 79 yo female pmhx HTN, DM on metformin, multiple abdominal surgeries and HLD presented from home with complaints of abdominal pain x3 days.  As per patient she has had multiple abdominal surgeries and typically about 2 times a year she experiences abdominal pain with constipation however this time it did not dissipate.        PAST MEDICAL & SURGICAL HISTORY:  Type 2 diabetes mellitus without complication, unspecified whether long term insulin use  Incontinence in female  Hyperlipidemia, unspecified hyperlipidemia type  Hypertension, unspecified type  Other age-related cataract of both eyes  H/O inguinal hernia  H/O umbilical hernia repair      Allergies  No Known Allergies      FAMILY HISTORY:  No pertinent family history in first degree relatives      Social History:   From home.       Review of Systems:  See Brief Hospital Course      Vitals During Exam:   HR:   BP:   RR:  sPO2:     Physical Examination:    General: Pleasant, elderly, obese female, lying in bed appears comfortable.     HEENT: Pupils equal, reactive to light.  Symmetric. Oral mucosa dry.    PULM: Symmetrical thorax expansion upon respiration.  Clear to auscultation bilaterally, some fine crackles appreciated at bilateral bases.  No significant sputum production appreciated.     CVS: Irregular rate and rhythm, no murmurs, rubs, or gallops appreciated.     ABD: Soft, nondistended, +mild tenderness upon palpation over periumbilical area, normoactive bowel sounds x4, no masses appreciated.  Midline vertical healed surgical scar appreciated.      EXT: No edema, nontender, DP pulses symmetrical. SCDs in place    SKIN: Warm and well perfused, no rashes noted.    NEURO: Alert, oriented, interactive, nonfocal, moving all 4 extremities      Medications:  cefTRIAXone   IVPB      diltiazem Infusion 10 mG/Hr IV Continuous <Continuous>  acetaminophen  Suppository .. 650 milliGRAM(s) Rectal every 6 hours PRN  heparin  Injectable 5000 Unit(s) SubCutaneous every 8 hours  pantoprazole  Injectable 40 milliGRAM(s) IV Push daily  dextrose 40% Gel 15 Gram(s) Oral once PRN  dextrose 50% Injectable 12.5 Gram(s) IV Push once  dextrose 50% Injectable 25 Gram(s) IV Push once  dextrose 50% Injectable 25 Gram(s) IV Push once  glucagon  Injectable 1 milliGRAM(s) IntraMuscular once PRN  insulin lispro (HumaLOG) corrective regimen sliding scale   SubCutaneous every 6 hours  dextrose 5%. 1000 milliLiter(s) IV Continuous <Continuous>  lactated ringers Bolus 1000 milliLiter(s) IV Bolus once  lactated ringers. 1000 milliLiter(s) IV Continuous <Continuous>  magnesium sulfate  IVPB 1 Gram(s) IV Intermittent every 1 hour      ICU Vital Signs Last 24 Hrs  T(C): 38.8 (2018 18:00), Max: 38.8 (2018 18:00)  T(F): 101.8 (2018 18:00), Max: 101.8 (2018 18:00)  HR: 111 (2018 19:00) (92 - 135)  BP: 94/59 (2018 19:00) (66/42 - 129/69)  BP(mean): 70 (:) (70 - 80)  ABP: --  ABP(mean): --  RR: 33 (:00) (20 - 36)  SpO2: 96% (:) (94% - 98%)    Vital Signs Last 24 Hrs  T(C): 38.8 (2018 18:00), Max: 38.8 (2018 18:00)  T(F): 101.8 (2018 18:00), Max: 101.8 (2018 18:00)  HR: 111 (:00) (92 - 135)  BP: 94/59 (2018 19:00) (66/42 - 129/69)  BP(mean): 70 (:00) (70 - 80)  RR: 33 (:00) (20 - 36)  SpO2: 96% (:00) (94% - 98%)      I&O's Detail    2018 07:01  -  2018 19:38  --------------------------------------------------------  IN:    diltiazem Infusion: 5 mL    diltiazem Infusion: 30 mL    IV PiggyBack: 50 mL    lactated ringers.: 2000 mL    lactated ringers.: 300 mL    Solution: 100 mL    Solution: 300 mL  Total IN: 2785 mL    OUT:    Voided: 550 mL  Total OUT: 550 mL  Total NET: 2235 mL      LABS:                        12.8   5.4   )-----------( 203      ( 2018 14:40 )             38.4         137  |  104  |  42<H>  ----------------------------<  193<H>  3.5   |  19<L>  |  1.64<H>    Ca    7.6<L>      2018 14:40  Mg     1.1         TPro  8.1  /  Alb  3.2<L>  /  TBili  0.6  /  DBili  x   /  AST  19  /  ALT  21  /  AlkPhos  51        CARDIAC MARKERS ( 2018 15:00 )  <.017 ng/mL / x     / x     / x     / x          CAPILLARY BLOOD GLUCOSE  POCT Blood Glucose.: 197 mg/dL (2018 18:13)      PT/INR - ( 2018 09:10 )   PT: 14.6 sec;   INR: 1.29 ratio    PTT - ( 2018 09:10 )  PTT:29.9 sec      Urinalysis Basic - ( 2018 09:10 )  Color: Yellow / Appearance: slightly cloudy / S.015 / pH: x  Gluc: x / Ketone: Trace  / Bili: Moderate / Urobili: Negative   Blood: x / Protein: 30 mg/dL / Nitrite: Negative   Leuk Esterase: Trace / RBC: 0-4 /HPF / WBC 0-2 /HPF   Sq Epi: x / Non Sq Epi: Moderate / Bacteria: Moderate /HPF      CULTURES:  Cultures pending.       RADIOLOGY:   < from: CT Abdomen and Pelvis w/ Oral Cont (18 @ 11:22) >  EXAM:  CT ABDOMEN AND PELVIS OC      PROCEDURE DATE:  2018        INTERPRETATION:  Exam Type:  CT ABDOMEN AND PELVIS OC   Date and Time: 2018 11:22 AM  Indication: Abdominal pain and distention  Compared to: 2015  Technique: CT of the ABDOMEN and PELVIS:  No intravenous contrast was   administered at physician request. This limits sensitivity for certain   processes. Oral contrast was  administered.    COMMENTS:    LOWER LUNGS AND PLEURA: Coronary vascular calcification. Thickening of   the distal esophagus, correlate for esophagitis.     LIVER: within normal limits.  BILE DUCTS: normal caliber.  GALLBLADDER: Fundal gallstones and/or gallbladder calcification.           PANCREAS: within normal limits.  SPLEEN: within normallimits.  ADRENALS: Indeterminate left adrenal lesion unchanged. Right adrenal   gland is unremarkable.    KIDNEYS, URETERS AND BLADDER: Mild bilateral renal atrophy and renal   cortical scarring. No hydronephrosis. Nonspecific bilateral perinephric   stranding and trace perinephric fluid. Nonobstructive anterior left lower   pole intrarenal calculus. The ureters are unremarkable. The bladder is   within normal limits.    PELVIS: Trace pelvic free fluid. No adnexal masses. Subcentimeter pelvic   lymph nodes.       BOWEL: Question nodularity along the second portion of the duodenum,   nonspecific in nature, upper endoscopy can be obtained for further   evaluation.. Colonic diverticulosis without definite evidence of   diverticulitis. Diffusely dilated fluid-filled small and large bowel to   the level of the splenic flexor. No definite evidence of transition zone   in the small or large bowel. Mildly distended descending colon sigmoid   colon and rectum with fluid and stool. Findings may represent a   nonspecific ileus versus ileal colitis versus unclear etiology. A mild   stricture of the proximal descending colon cannot entirely be excluded.  PERITONEUM: no ascites or free air, no fluid collection.  RETROPERITONEUM: within normal limits.      VESSELS: atherosclerotic changes.      ABDOMINAL WALL: Marked weakness of the anterior abdominal wall with   protrusion of small large bowel anteriorly.  BONES: Degenerative changes. Grade 2 anterolisthesis of L5 on S1 with   bilateral spondylolysis at L5.    IMPRESSION:     Diffusely dilated fluid-filled small and large bowel to the level of the   splenic flexor. No definite evidence of transition zone in the small or   large bowel. Mildly distended descending colon sigmoid colon and rectum   with fluid and stool. Findings may represent a nonspecific ileus versus   ileal colitis versus unclear etiology. A mild stricture of the proximal   descending colon cannot entirely be excluded.    Thickening of the distal esophagus, correlate foresophagitis    Question nodularity along the second portion of the duodenum, nonspecific   in nature, upper endoscopy can be obtained for further evaluation    Colonic diverticulosis without definite diverticulitis.    < end of copied text >      < from: Xray Chest 1 View-PORTABLE IMMEDIATE (18 @ 09:47) >  EXAM:  XR CHEST PORTABLE IMMED 1V      PROCEDURE DATE:  2018        INTERPRETATION:  CLINICAL STATEMENT: Chest Pain.    TECHNIQUE: AP view of the chest.    COMPARISON: 3/12/2013    FINDINGS/  IMPRESSION:  No gross consolidation or pleural effusion    Heart size cannot be accurately assessed in this projection.    < end of copied text >      SUPPLEMENTAL O2:  N  LINES: Peripheral   NEWELL: N  PPx: Heparin  CONTACT: N

## 2018-11-25 NOTE — H&P ADULT - ATTENDING COMMENTS
Patient seen and examined in ED addendum to above    pt feels much better after fluids  abd pain resolved  no palp,      Assessment  Super SIRS - due to Small bowel stricture  RAJ due to hypovolumia/atn  Lactic acidosis combination of dehydration and  exam not consistent to mesenteric ischemia  abnormal UA- possible UTI doubt cause of all acute problems  Underlying HTN, High chol, DM2    Plan  Admit to ICU  IVF  NPO  epimeric abx for UTI, pending urine culture  Trend lactic acid  case d/w Srug  check abd xray in am  insulin ss   npo for now  will monitor Patient seen and examined in ED addendum to above    pt feels much better after fluids  abd pain resolved  no palp,      Assessment  Severe SIRS - due to Small bowel stricture  RAJ due to hypovolumia/atn  Lactic acidosis combination of dehydration and  exam not consistent to mesenteric ischemia  abnormal UA- possible UTI doubt cause of all acute problems  Underlying HTN, High chol, DM2    Plan  Admit to ICU  IVF  NPO  epimeric abx for UTI, pending urine culture  Trend lactic acid  case d/w Srug  check abd xray in am  insulin ss   npo for now  will monitor

## 2018-11-25 NOTE — ED ADULT NURSE NOTE - ED STAT RN HANDOFF DETAILS
pt transported off unit by Marshall BASHIR and ninfa DOS SANTOS. pt on cardiac monitor and . pt offers no complaints. report given to Dena Bashir.

## 2018-11-25 NOTE — H&P ADULT - PROBLEM SELECTOR PLAN 9
Heparin SQ for DVT prophylaxis  Protonix for GI prophylaxis  Maintain bedrest while critically ill  NPO  Full code status

## 2018-11-25 NOTE — H&P ADULT - PROBLEM SELECTOR PLAN 1
Patient with history of several abdominal surgeries and stricture on CT scan.  Clinically improving with hydration, continue hydration.  NPO, monitor I&O's, follow up abdominal imaging in the AM.  Repeat lactate, will need NGT for gastric decompression if develops nausea and vomiting.  Discussed with surgical team at bedside, no surgical intervention indicated at this time

## 2018-11-25 NOTE — ED PROVIDER NOTE - NS ED ROS FT
Constitutional: (-) fever  (-)chills  (-)sweats  Eyes/ENT: (-) blurry vision, (-) epistaxis  (-)rhinorrhea   (-) sore throat    Cardiovascular: (-) chest pain, (-) palpitations (-) edema   Respiratory: (-) cough, (-) shortness of breath   Gastrointestinal: (+)nausea  (+)vomiting, (-) diarrhea  (+) abdominal pain   :  (-)dysuria, (-)frequency, (-)urgency, (-)hematuria  Musculoskeletal: (-) neck pain, (-) back pain, (-) joint pain  Integumentary: (-) rash, (-) edema  Neurological: (-) headache, (-) altered mental status  (-)LOC

## 2018-11-25 NOTE — H&P ADULT - NSHPPHYSICALEXAM_GEN_ALL_CORE
Vital Signs Last 24 Hrs  T(F): 98.1 (25 Nov 2018 13:32), Max: 100.3 (25 Nov 2018 12:00)  HR: 98 (25 Nov 2018 13:32) (92 - 134)  BP: 119/66 (25 Nov 2018 13:32) (66/42 - 129/69)  BP(mean): 79 (25 Nov 2018 13:32) (79 - 79)  RR: 20 (25 Nov 2018 13:32) (20 - 31)  SpO2: 96% RA (25 Nov 2018 13:32) (94% - 98%)    Physical Exam  Gen:  Obese female resting in bed, NAD  ENT:  NC/AT, no JVD noted  Thorax:  Symmetric, no retractions  Lung:  CTA b/l  CV:  S1, S2. RRR  Abd:  Soft, Non-tender throughout to deep palpation.  BS hypoactive, large ventral hernia soft, reducible.   Extrem:  No C/C/E, DP/radial pulses +2  Neuro:  A&O x 3, no gross motor/sensory deficits

## 2018-11-25 NOTE — ED PROVIDER NOTE - CARE PLAN
Principal Discharge DX:	Acute abdominal pain Principal Discharge DX:	Partial intestinal obstruction, unspecified cause

## 2018-11-25 NOTE — ED ADULT NURSE NOTE - OBJECTIVE STATEMENT
77 YO female abdominal pain, nausea and vomiting started Friday night. History  of SBO, History of hiatal and inguinal hernia with surgery.

## 2018-11-25 NOTE — H&P ADULT - ASSESSMENT
78 year old female with PMH HTN, DM2, dyslipidemia presenting with partial intestinal obstruction, lactic acidosis, RAJ and UTI.

## 2018-11-25 NOTE — H&P ADULT - HISTORY OF PRESENT ILLNESS
78 year old female with PMH HTN, DM2, dyslipidemia presented to ED today from home with 3 day history of abdominal pain.  Patient describes pain as 8/10 in the lower quadrants, sharp and continual since Friday with no radiation.  States that she develops this pain at least once a year after eating foods such as corn, usually spontaneously resolves after about 1 day but this pain has continued and her brother urged her to come to ED today as she has not tolerated PO in three days.   Admits to associated vomiting of dark watery fluid about 4 times daily over past three days, had last formed bowel movement about three days ago.  Evaluation in ED revealed lactic acidosis, RAJ and UA suggestive of urinary tract infection, CT abdomen/pelvis with stricture suggestive of ileus but no ischemic or obstructive findings.  Aggressively hydrated with improvement of abdominal pain and lactic acidosis, seen by surgery and transferred to ICU for further management.  Patient denies diarrhea, melena, BRBPR, SOB, chest pain, fever, chills, dysuria, sick contacts, change in diet.

## 2018-11-25 NOTE — H&P ADULT - NSHPREVIEWOFSYSTEMS_GEN_ALL_CORE
Review of Systems:   	CONSTITUTIONAL: Denies fever, weight loss, or fatigue  	ENT: Denies dysphagia, odynophagia, blurred vision, tinnitus, neck stiffness  	RESPIRATORY: Denies cough, wheeze, hemoptysis, shortness of breath  	CARDIOVASCULAR: Denies chest pain, palpitations irregular HR  	GASTROINTESTINAL:  See HPI  	GENITOURINARY: Recently treated with keflex and Macrobid in the past month  	NEUROLOGICAL: Denies headaches, syncope, near syncope, dizziness, lightheadedness, headaches, seizures  	SKIN: Denies rashes, masses, bruising, lesions   	MUSCULOSKELETAL: Denies history of OA or gout, joint swelling  	PSYCHIATRIC: Denies depression, anxiety, mood swings, or difficulty sleeping  	HEME: Denies history of DVT/PE, blood transfusion

## 2018-11-25 NOTE — H&P ADULT - NSHPLABSRESULTS_GEN_ALL_CORE
EXAM:  CT ABDOMEN AND PELVIS OC    PROCEDURE DATE:  11/25/2018      INTERPRETATION:  Exam Type:  CT ABDOMEN AND PELVIS OC   Date and Time: 11/25/2018 11:22 AM  Indication: Abdominal pain and distention  Compared to: 4/21/2015  Technique: CT of the ABDOMEN and PELVIS:  No intravenous contrast was   administered at physician request. This limits sensitivity for certain   processes. Oral contrast was  administered.    COMMENTS:  LOWER LUNGS AND PLEURA: Coronary vascular calcification. Thickening of   the distal esophagus, correlate for esophagitis.   LIVER: within normal limits.  BILE DUCTS: normal caliber.  GALLBLADDER: Fundal gallstones and/or gallbladder calcification.           PANCREAS: within normal limits.  SPLEEN: within normallimits.  ADRENALS: Indeterminate left adrenal lesion unchanged. Right adrenal   gland is unremarkable.  KIDNEYS, URETERS AND BLADDER: Mild bilateral renal atrophy and renal   cortical scarring. No hydronephrosis. Nonspecific bilateral perinephric   stranding and trace perinephric fluid. Nonobstructive anterior left lower   pole intrarenal calculus. The ureters are unremarkable. The bladder is   within normal limits.  PELVIS: Trace pelvic free fluid. No adnexal masses. Subcentimeter pelvic   lymph nodes.     BOWEL: Question nodularity along the second portion of the duodenum,   nonspecific in nature, upper endoscopy can be obtained for further   evaluation.. Colonic diverticulosis without definite evidence of   diverticulitis. Diffusely dilated fluid-filled small and large bowel to   the level of the splenic flexor. No definite evidence of transition zone   in the small or large bowel. Mildly distended descending colon sigmoid   colon and rectum with fluid and stool. Findings may represent a   nonspecific ileus versus ileal colitis versus unclear etiology. A mild   stricture of the proximal descending colon cannot entirely be excluded.  PERITONEUM: no ascites or free air, no fluid collection.  RETROPERITONEUM: within normal limits.    VESSELS: atherosclerotic changes.      ABDOMINAL WALL: Marked weakness of the anterior abdominal wall with   protrusion of small large bowel anteriorly.  BONES: Degenerative changes. Grade 2 anterolisthesis of L5 on S1 with   bilateral spondylolysis at L5.    IMPRESSION:   Diffusely dilated fluid-filled small and large bowel to the level of the   splenic flexor. No definite evidence of transition zone in the small or   large bowel. Mildly distended descending colon sigmoid colon and rectum   with fluid and stool. Findings may represent a nonspecific ileus versus   ileal colitis versus unclear etiology. A mild stricture of the proximal   descending colon cannot entirely be excluded.  Thickening of the distal esophagus, correlate for esophagitis  Question nodularity along the second portion of the duodenum, nonspecific   in nature, upper endoscopy can be obtained for further evaluation  Colonic diverticulosis without definite diverticulitis.    SAYRA FITZPATRICK M.D., ATTENDING RADIOLOGIST  This document has been electronically signed. Nov 25 2018 11:38AM

## 2018-11-25 NOTE — ED ADULT TRIAGE NOTE - CHIEF COMPLAINT QUOTE
pt presents c/o abdominal pain, nausea, vomiting since friday after eating dinner. denies any additional symptoms. pt tachycardiac in triage PMHX: dm

## 2018-11-25 NOTE — CONSULT NOTE ADULT - PROBLEM SELECTOR RECOMMENDATION 9
- ICU admission for management of DM and acidosis, which is most likely secondary to dehydration and DM/metformin   - Serial abdominal exams  - Strict I & O's  - If vomting NGT  - NPO/IVFs and repeat lactate  - AXR tomorrow to see progress of oral contrast  - No surgical intervention at this time  D/W with ICU team, ER, patient/family

## 2018-11-25 NOTE — ED PROVIDER NOTE - PHYSICAL EXAMINATION
General:     NAD,  ill appearing  Head:     NC/AT, EOMI, oral mucosa dry   Neck:     trachea midline  Lungs:     CTA b/l, no w/r/r  CVS:     S1S2, RRR, no m/g/r  Abd:     distended generalized tenderness  Ext:    2+ radial and pedal pulses, no c/c/e  Neuro: AAOx3, no sensory/motor deficits

## 2018-11-25 NOTE — H&P ADULT - PROBLEM SELECTOR PLAN 5
Moderate to severe, continue IV hydration as patient must remain NPO.  Monitor HH and electrolytes, I&O's and for urine output

## 2018-11-25 NOTE — H&P ADULT - PROBLEM SELECTOR PLAN 3
Likely pre-renal due to acute dehydration.  Continue IV hydration, monitor electrolytes and hold metformin.

## 2018-11-25 NOTE — PATIENT PROFILE ADULT - FUNCTIONAL SCREEN CURRENT LEVEL: SWALLOWING (IF SCORE 2 OR MORE FOR ANY ITEM, CONSULT REHAB SERVICES), MLM)
Is This A New Presentation, Or A Follow-Up?: Skin Lesions How Severe Is Your Skin Lesion?: mild Have Your Skin Lesions Been Treated?: not been treated 0 = swallows foods/liquids without difficulty

## 2018-11-25 NOTE — H&P ADULT - PROBLEM SELECTOR PLAN 4
UA suggestive of acute UTI although patient recently on outpatient course of keflex and macrobid.  Will send urine culture, start ceftriaxone IV and narrow antibiotic coverage when cultures finalized

## 2018-11-25 NOTE — CHART NOTE - NSCHARTNOTEFT_GEN_A_CORE
While being assisted on bedpan patient noted to have HR to 170, subsequent EKG reveals atrial fibrillation.  Patient asymptomatic, remains otherwise stable from respiratory and BP standpoint    Vital Signs  T(F): 98.1 (25 Nov 2018 13:32), Max: 100.3 (25 Nov 2018 12:00)  HR: 135 (25 Nov 2018 16:00) (92 - 175)  BP: 97/61 (25 Nov 2018 16:00) (66/42 - 129/69)  BP(mean): 73 (25 Nov 2018 16:00) (73 - 79)  RR: 34 (25 Nov 2018 16:00) (20 - 34)  SpO2: 95% RA (25 Nov 2018 16:00) (94% - 98%)    New laboratory data pending    Physical Exam  Gen:  Obese female resting in bed, NAD  ENT:  NC/AT, no JVD noted  Thorax:  Symmetric, no retractions  Lung:  CTA b/l  CV:  S1, S2. RRR  Abd:  Soft, Non-tender throughout to deep palpation.  BS hypoactive, large ventral hernia soft, reducible.   Extrem:  No C/C/E, DP/radial pulses +2  Neuro:  A&O x 3, no gross motor/sensory deficits    A/P  New onset atrial fibrillation with rapid ventricular response.  Given diltiazem 10mg IVP with reduction of HR to 100's, will start diltiazem infusion for rate control.    Likely related to acute illness, cannot rule out underlying cardiac cause at this time.  Hypomagnesemia likely contributing to arrythmia, supplement magnesium IV.  No overt cardiac ischemia on EKG, will repeat EKG in AM and trend troponin.  Check echocardiogram and TFT's in the AM, consult cardiology in AM, discuss anticoagulation if remains in afib. While being assisted on bedpan patient noted to have HR to 170, subsequent EKG reveals atrial fibrillation.  Patient asymptomatic, remains otherwise stable from respiratory and BP standpoint    Vital Signs  T(F): 98.1 (25 Nov 2018 13:32), Max: 100.3 (25 Nov 2018 12:00)  HR: 135 (25 Nov 2018 16:00) (92 - 175)  BP: 97/61 (25 Nov 2018 16:00) (66/42 - 129/69)  BP(mean): 73 (25 Nov 2018 16:00) (73 - 79)  RR: 34 (25 Nov 2018 16:00) (20 - 34)  SpO2: 95% RA (25 Nov 2018 16:00) (94% - 98%)    New laboratory data pending    Physical Exam  Gen:  Obese female resting in bed, NAD  ENT:  NC/AT, no JVD noted  Thorax:  Symmetric, no retractions  Lung:  CTA b/l  CV:  S1, S2. RRR  Abd:  Soft, Non-tender throughout to deep palpation.  BS hypoactive, large ventral hernia soft, reducible.   Extrem:  No C/C/E, DP/radial pulses +2  Neuro:  A&O x 3, no gross motor/sensory deficits    A/P  New onset atrial fibrillation with rapid ventricular response.  Given diltiazem 10mg IVP with reduction of HR to 100's, will start diltiazem infusion for rate control.    Likely related to acute illness, cannot rule out underlying cardiac cause at this time.  Hypomagnesemia likely contributing to arrythmia, supplement magnesium IV.  No overt cardiac ischemia on EKG, will repeat EKG in AM and trend troponin.  Check echocardiogram and TFT's in the AM, consult cardiology in AM, discuss anticoagulation if remains in afib.    Of note, patient had formed bowel movement.  Continue to monitor in ICU. While being assisted on bedpan patient noted to have HR to 170, subsequent EKG reveals atrial fibrillation.  Patient asymptomatic, remains otherwise stable from respiratory and BP standpoint    Vital Signs  T(F): 98.1 (25 Nov 2018 13:32), Max: 100.3 (25 Nov 2018 12:00)  HR: 135 (25 Nov 2018 16:00) (92 - 175)  BP: 97/61 (25 Nov 2018 16:00) (66/42 - 129/69)  BP(mean): 73 (25 Nov 2018 16:00) (73 - 79)  RR: 34 (25 Nov 2018 16:00) (20 - 34)  SpO2: 95% RA (25 Nov 2018 16:00) (94% - 98%)    New laboratory data pending    Physical Exam  Gen:  Obese female resting in bed, NAD  ENT:  NC/AT, no JVD noted  Thorax:  Symmetric, no retractions  Lung:  CTA b/l  CV:  S1, S2. RRR  Abd:  Soft, Non-tender throughout to deep palpation.  BS hypoactive, large ventral hernia soft, reducible.   Extrem:  No C/C/E, DP/radial pulses +2  Neuro:  A&O x 3, no gross motor/sensory deficits    A/P  New onset atrial fibrillation with rapid ventricular response.  Given diltiazem 10mg IVP with reduction of HR to 100's, will start diltiazem infusion for rate control.    Likely related to acute illness, cannot rule out underlying cardiac cause at this time.  Hypomagnesemia likely contributing to arrythmia, supplement magnesium IV.  No overt cardiac ischemia on EKG, will repeat EKG in AM and trend troponin.  Check echocardiogram and TFT's in the AM, consult cardiology in AM, discuss anticoagulation if remains in afib.    Of note, patient had formed brown bowel movement.  Continue to monitor in ICU. While being assisted on bedpan patient noted to have HR to 170, subsequent EKG reveals atrial fibrillation.  Patient asymptomatic, remains otherwise stable from respiratory and BP standpoint    Vital Signs  T(F): 98.1 (25 Nov 2018 13:32), Max: 100.3 (25 Nov 2018 12:00)  HR: 135 (25 Nov 2018 16:00) (92 - 175)  BP: 97/61 (25 Nov 2018 16:00) (66/42 - 129/69)  BP(mean): 73 (25 Nov 2018 16:00) (73 - 79)  RR: 34 (25 Nov 2018 16:00) (20 - 34)  SpO2: 95% RA (25 Nov 2018 16:00) (94% - 98%)    New laboratory data pending    EKG  Atrial fibrillation with rate 140's, no acute ST changes noted    Physical Exam  Gen:  Obese female resting in bed, NAD  ENT:  NC/AT, no JVD noted  Thorax:  Symmetric, no retractions  Lung:  CTA b/l  CV:  S1, S2. RRR  Abd:  Soft, Non-tender throughout to deep palpation.  BS hypoactive, large ventral hernia soft, reducible.   Extrem:  No C/C/E, DP/radial pulses +2  Neuro:  A&O x 3, no gross motor/sensory deficits    A/P  New onset atrial fibrillation with rapid ventricular response.  Given diltiazem 10mg IVP with reduction of HR to 100's, will start diltiazem infusion for rate control.    Likely related to acute illness, cannot rule out underlying cardiac cause at this time.  Hypomagnesemia likely contributing to arrythmia, supplement magnesium IV.  No overt cardiac ischemia on EKG, will repeat EKG in AM and trend troponin.  Check echocardiogram and TFT's in the AM, consult cardiology in AM, discuss anticoagulation if remains in afib.    Of note, patient had formed brown bowel movement.  Continue to monitor in ICU. While being assisted on bedpan patient noted to have HR to 170, subsequent EKG reveals atrial fibrillation.  Patient asymptomatic, remains otherwise stable from respiratory and BP standpoint    Vital Signs  T(F): 98.1 (25 Nov 2018 13:32), Max: 100.3 (25 Nov 2018 12:00)  HR: 135 (25 Nov 2018 16:00) (92 - 175)  BP: 97/61 (25 Nov 2018 16:00) (66/42 - 129/69)  BP(mean): 73 (25 Nov 2018 16:00) (73 - 79)  RR: 34 (25 Nov 2018 16:00) (20 - 34)  SpO2: 95% RA (25 Nov 2018 16:00) (94% - 98%)    New laboratory data pending    EKG  Atrial fibrillation with rate 140's, no acute ST changes noted    Physical Exam  Gen:  Obese female resting in bed, NAD  ENT:  NC/AT, no JVD noted  Thorax:  Symmetric, no retractions  Lung:  CTA b/l  CV:  S1, S2. RRR  Abd:  Soft, Non-tender throughout to deep palpation.  BS hypoactive, large ventral hernia soft, reducible.   Extrem:  No C/C/E, DP/radial pulses +2  Neuro:  A&O x 3, no gross motor/sensory deficits    A/P  New onset atrial fibrillation with rapid ventricular response.  Given diltiazem 10mg IVP with reduction of HR to 100's, will start diltiazem infusion for rate control.    Likely related to acute illness, cannot rule out underlying cardiac cause at this time.  Hypomagnesemia likely contributing to arrythmia, supplement magnesium IV.  No overt cardiac ischemia on EKG, will repeat EKG in AM and trend troponin.  Check echocardiogram and TFT's in the AM, consult cardiology in AM.  Discuss anticoagulation if remains in afib, at this point risk of anticoagulation outweighs potential benefit as patient may need surgical intervention overnight if does not improve.    Of note, patient had formed brown bowel movement.  Continue to monitor in ICU. While being assisted on bedpan patient noted to have HR to 170, subsequent EKG reveals atrial fibrillation.  Patient asymptomatic, remains otherwise stable from respiratory and BP standpoint    Vital Signs  T(F): 98.1 (25 Nov 2018 13:32), Max: 100.3 (25 Nov 2018 12:00)  HR: 135 (25 Nov 2018 16:00) (92 - 175)  BP: 97/61 (25 Nov 2018 16:00) (66/42 - 129/69)  BP(mean): 73 (25 Nov 2018 16:00) (73 - 79)  RR: 34 (25 Nov 2018 16:00) (20 - 34)  SpO2: 95% RA (25 Nov 2018 16:00) (94% - 98%)    New laboratory data pending    EKG  Atrial fibrillation with rate 140's, no acute ST changes noted    Physical Exam  Gen:  Obese female resting in bed, NAD  ENT:  NC/AT, no JVD noted  Thorax:  Symmetric, no retractions  Lung:  CTA b/l  CV:  S1, S2. RRR  Abd:  Soft, Non-tender throughout to deep palpation.  BS hypoactive, large ventral hernia soft, reducible.   Extrem:  No C/C/E, DP/radial pulses +2  Neuro:  A&O x 3, no gross motor/sensory deficits    A/P  New onset atrial fibrillation with rapid ventricular response.  Given diltiazem 10mg IVP with reduction of HR to 100's, will start diltiazem infusion for rate control.    Likely related to acute illness, cannot rule out underlying cardiac cause at this time.  Hypomagnesemia likely contributing to arrythmia, supplement magnesium IV.  No overt cardiac ischemia on EKG, will repeat EKG in AM and trend troponin.  Check echocardiogram and TFT's in the AM, consult cardiology in AM.  Discuss anticoagulation if remains in afib, at this point risk of anticoagulation outweighs potential benefit as patient may need surgical intervention overnight if does not improve.    Of note, patient had formed brown bowel movement.  Continue to monitor in ICU.    Events discussed with Dr. Baptiste via phone who is in agreement with assessment and plan.

## 2018-11-25 NOTE — H&P ADULT - NSHPSOCIALHISTORY_GEN_ALL_CORE
Lives in private home with brother who is active in patients care  Remote cigarette smoker, has not smoked in 60 years  Denies ETOH use  Denies history of ETOH abuse, substance abuse

## 2018-11-25 NOTE — PROGRESS NOTE ADULT - ASSESSMENT
77 yo female pmhx HTN, DM2, HLD and multiple abdominal surgeries admitted with partial bowel obstruction, dehydration, RAJ and Afib with RVR.      NEURO: Insomnia   CV: Afib with RVR   RESP: Tachypneic   RENAL: RAJ  GI: NPO, partial bowel obstruction   ENDO: DM on ISS.  POCT q6 hours while NPO.   ID: Ceftriaxone   HEME: Heparin for chemical VTE ppx.   DISPO: Full code. 77 yo female pmhx HTN, DM2, HLD and multiple abdominal surgeries admitted with partial bowel obstruction, dehydration, RAJ and Afib with RVR.      NEURO: Insomnia, takes two tylenol PM and ambien 10mg PO qhs at home.  Patient NPO, Benadryl IV x1 ordered.    CV: Afib with RVR, patient on cardizem gtt; Patient with borderline BP and HR uncontrolled.  Will monitor for now.  Will consider change to amiodarone if patient hypotensive.    RESP: Tachypneic likely secondary to partial bowel obstruction with bowel distension elevating diaphragms.  Monitor for now.  NC, titrate FIO2 for sPO2 >92%.  Keep HOB >30 degrees.    RENAL: RAJ, avoid nephrotoxic medications, renally dose medications, trend urine output, BUN/Cr and electrolytes.  Replace electrolytes as needed.  Repeat BMP with hypokalemia, Kphos IV ordered.    GI: NPO, partial bowel obstruction.  Had 3 BMs in total so far, no nausea/vomiting.  GI and surgery following.   ENDO: DM on ISS.  POCT q6 hours while NPO.   ID: Ceftriaxone for coverage.  Cultures pending.   HEME: Heparin for chemical VTE ppx.   DISPO: Full code.     Critical Care time: 55 mins assessing presenting problems of acute illness that poses high probability of life threatening deterioration or end organ damage/dysfunction.  Medical decision making inculding Initiating plan of care, reviewing data, reviewing radiology, discussing with multidisciplinary team, non inclusive of procedures, discussing goals of care with patient/family

## 2018-11-26 LAB
ANION GAP SERPL CALC-SCNC: 11 MMOL/L — SIGNIFICANT CHANGE UP (ref 5–17)
BUN SERPL-MCNC: 34 MG/DL — HIGH (ref 7–23)
C DIFF BY PCR RESULT: SIGNIFICANT CHANGE UP
C DIFF TOX GENS STL QL NAA+PROBE: SIGNIFICANT CHANGE UP
CALCIUM SERPL-MCNC: 7.4 MG/DL — LOW (ref 8.4–10.5)
CHLORIDE SERPL-SCNC: 106 MMOL/L — SIGNIFICANT CHANGE UP (ref 96–108)
CO2 SERPL-SCNC: 20 MMOL/L — LOW (ref 22–31)
CREAT SERPL-MCNC: 1.12 MG/DL — SIGNIFICANT CHANGE UP (ref 0.5–1.3)
CULTURE RESULTS: NO GROWTH — SIGNIFICANT CHANGE UP
GLUCOSE BLDC GLUCOMTR-MCNC: 121 MG/DL — HIGH (ref 70–99)
GLUCOSE BLDC GLUCOMTR-MCNC: 137 MG/DL — HIGH (ref 70–99)
GLUCOSE BLDC GLUCOMTR-MCNC: 143 MG/DL — HIGH (ref 70–99)
GLUCOSE BLDC GLUCOMTR-MCNC: 154 MG/DL — HIGH (ref 70–99)
GLUCOSE SERPL-MCNC: 134 MG/DL — HIGH (ref 70–99)
HBA1C BLD-MCNC: 6.8 % — HIGH (ref 4–5.6)
HCT VFR BLD CALC: 34.5 % — SIGNIFICANT CHANGE UP (ref 34.5–45)
HGB BLD-MCNC: 11.6 G/DL — SIGNIFICANT CHANGE UP (ref 11.5–15.5)
MAGNESIUM SERPL-MCNC: 1.7 MG/DL — SIGNIFICANT CHANGE UP (ref 1.6–2.6)
MCHC RBC-ENTMCNC: 31.5 PG — SIGNIFICANT CHANGE UP (ref 27–34)
MCHC RBC-ENTMCNC: 33.7 GM/DL — SIGNIFICANT CHANGE UP (ref 32–36)
MCV RBC AUTO: 93.5 FL — SIGNIFICANT CHANGE UP (ref 80–100)
PHOSPHATE SERPL-MCNC: 4 MG/DL — SIGNIFICANT CHANGE UP (ref 2.5–4.5)
PLATELET # BLD AUTO: 188 K/UL — SIGNIFICANT CHANGE UP (ref 150–400)
POTASSIUM SERPL-MCNC: 3.7 MMOL/L — SIGNIFICANT CHANGE UP (ref 3.5–5.3)
POTASSIUM SERPL-SCNC: 3.7 MMOL/L — SIGNIFICANT CHANGE UP (ref 3.5–5.3)
RBC # BLD: 3.69 M/UL — LOW (ref 3.8–5.2)
RBC # FLD: 11.8 % — SIGNIFICANT CHANGE UP (ref 10.3–14.5)
SODIUM SERPL-SCNC: 137 MMOL/L — SIGNIFICANT CHANGE UP (ref 135–145)
SPECIMEN SOURCE: SIGNIFICANT CHANGE UP
T3 SERPL-MCNC: 40 NG/DL — LOW (ref 80–200)
T4 AB SER-ACNC: 5.2 UG/DL — SIGNIFICANT CHANGE UP (ref 4.6–12)
TROPONIN I SERPL-MCNC: <.017 NG/ML — LOW (ref 0.02–0.06)
TSH SERPL-MCNC: 0.46 UIU/ML — SIGNIFICANT CHANGE UP (ref 0.27–4.2)
WBC # BLD: 6.9 K/UL — SIGNIFICANT CHANGE UP (ref 3.8–10.5)
WBC # FLD AUTO: 6.9 K/UL — SIGNIFICANT CHANGE UP (ref 3.8–10.5)

## 2018-11-26 PROCEDURE — 99222 1ST HOSP IP/OBS MODERATE 55: CPT

## 2018-11-26 PROCEDURE — 74018 RADEX ABDOMEN 1 VIEW: CPT | Mod: 26

## 2018-11-26 PROCEDURE — 99232 SBSQ HOSP IP/OBS MODERATE 35: CPT

## 2018-11-26 PROCEDURE — 93010 ELECTROCARDIOGRAM REPORT: CPT

## 2018-11-26 PROCEDURE — 93306 TTE W/DOPPLER COMPLETE: CPT | Mod: 26

## 2018-11-26 RX ORDER — LANOLIN ALCOHOL/MO/W.PET/CERES
6 CREAM (GRAM) TOPICAL AT BEDTIME
Qty: 0 | Refills: 0 | Status: DISCONTINUED | OUTPATIENT
Start: 2018-11-26 | End: 2018-11-29

## 2018-11-26 RX ORDER — DIPHENHYDRAMINE HCL 50 MG
50 CAPSULE ORAL ONCE
Qty: 0 | Refills: 0 | Status: COMPLETED | OUTPATIENT
Start: 2018-11-26 | End: 2018-11-26

## 2018-11-26 RX ORDER — MAGNESIUM SULFATE 500 MG/ML
1 VIAL (ML) INJECTION
Qty: 0 | Refills: 0 | Status: COMPLETED | OUTPATIENT
Start: 2018-11-26 | End: 2018-11-26

## 2018-11-26 RX ORDER — POTASSIUM CHLORIDE 20 MEQ
10 PACKET (EA) ORAL
Qty: 0 | Refills: 0 | Status: COMPLETED | OUTPATIENT
Start: 2018-11-26 | End: 2018-11-26

## 2018-11-26 RX ORDER — INSULIN LISPRO 100/ML
VIAL (ML) SUBCUTANEOUS
Qty: 0 | Refills: 0 | Status: DISCONTINUED | OUTPATIENT
Start: 2018-11-26 | End: 2018-11-27

## 2018-11-26 RX ADMIN — SODIUM CHLORIDE 125 MILLILITER(S): 9 INJECTION, SOLUTION INTRAVENOUS at 13:17

## 2018-11-26 RX ADMIN — HEPARIN SODIUM 5000 UNIT(S): 5000 INJECTION INTRAVENOUS; SUBCUTANEOUS at 21:42

## 2018-11-26 RX ADMIN — Medication 10 MG/HR: at 02:55

## 2018-11-26 RX ADMIN — HEPARIN SODIUM 5000 UNIT(S): 5000 INJECTION INTRAVENOUS; SUBCUTANEOUS at 14:58

## 2018-11-26 RX ADMIN — Medication 100 GRAM(S): at 10:51

## 2018-11-26 RX ADMIN — HEPARIN SODIUM 5000 UNIT(S): 5000 INJECTION INTRAVENOUS; SUBCUTANEOUS at 06:13

## 2018-11-26 RX ADMIN — PANTOPRAZOLE SODIUM 40 MILLIGRAM(S): 20 TABLET, DELAYED RELEASE ORAL at 16:05

## 2018-11-26 RX ADMIN — Medication 50 MILLIGRAM(S): at 21:43

## 2018-11-26 RX ADMIN — Medication 100 GRAM(S): at 09:38

## 2018-11-26 RX ADMIN — Medication 6 MILLIGRAM(S): at 21:43

## 2018-11-26 RX ADMIN — Medication 2: at 12:03

## 2018-11-26 RX ADMIN — SODIUM CHLORIDE 125 MILLILITER(S): 9 INJECTION, SOLUTION INTRAVENOUS at 02:55

## 2018-11-26 RX ADMIN — CEFTRIAXONE 100 GRAM(S): 500 INJECTION, POWDER, FOR SOLUTION INTRAMUSCULAR; INTRAVENOUS at 13:12

## 2018-11-26 RX ADMIN — Medication 100 MILLIEQUIVALENT(S): at 11:51

## 2018-11-26 RX ADMIN — POTASSIUM PHOSPHATE, MONOBASIC POTASSIUM PHOSPHATE, DIBASIC 85 MILLIMOLE(S): 236; 224 INJECTION, SOLUTION INTRAVENOUS at 00:53

## 2018-11-26 RX ADMIN — Medication 100 MILLIEQUIVALENT(S): at 10:36

## 2018-11-26 NOTE — PROGRESS NOTE ADULT - SUBJECTIVE AND OBJECTIVE BOX
Follow-up Critical Care Progress Note  Chief Complaint : Partial intestinal obstruction      Last night events reviewed, noted new afib, s/p cardizem drip now rate controlled on cardizem 5mg/hr  pt has no cp, sob, palp, n/v, abd pain  states almost back to baseline  but continues to have little appetite  + diarrhea overnight  + Fever overnight abx broaden    case reviewed with surgical staff today ? advance diet.  awaiting am abd xray      Allergies :No Known Allergies      PAST MEDICAL & SURGICAL HISTORY:  Type 2 diabetes mellitus without complication, unspecified whether long term insulin use  Incontinence in female  Hyperlipidemia, unspecified hyperlipidemia type  Hypertension, unspecified type  Other age-related cataract of both eyes  H/O inguinal hernia  H/O umbilical hernia repair      Medications:  MEDICATIONS  (STANDING):  cefTRIAXone   IVPB      cefTRIAXone   IVPB 1 Gram(s) IV Intermittent every 24 hours  dextrose 5%. 1000 milliLiter(s) (50 mL/Hr) IV Continuous <Continuous>  dextrose 50% Injectable 12.5 Gram(s) IV Push once  dextrose 50% Injectable 25 Gram(s) IV Push once  dextrose 50% Injectable 25 Gram(s) IV Push once  diltiazem Infusion 10 mG/Hr (10 mL/Hr) IV Continuous <Continuous>  heparin  Injectable 5000 Unit(s) SubCutaneous every 8 hours  insulin lispro (HumaLOG) corrective regimen sliding scale   SubCutaneous every 6 hours  lactated ringers. 1000 milliLiter(s) (125 mL/Hr) IV Continuous <Continuous>  pantoprazole  Injectable 40 milliGRAM(s) IV Push daily    MEDICATIONS  (PRN):  acetaminophen  Suppository .. 650 milliGRAM(s) Rectal every 6 hours PRN Temp greater or equal to 38C (100.4F)  dextrose 40% Gel 15 Gram(s) Oral once PRN Blood Glucose LESS THAN 70 milliGRAM(s)/deciliter  glucagon  Injectable 1 milliGRAM(s) IntraMuscular once PRN Glucose LESS THAN 70 milligrams/deciliter      LABS:                        11.6   6.9   )-----------( 188      ( 2018 06:25 )             34.5     -    136  |  105  |  38<H>  ----------------------------<  129<H>  3.2<L>   |  18<L>  |  1.23    Ca    7.8<L>      2018 22:20  Phos  2.8       Mg     2.1         TPro  8.1  /  Alb  3.2<L>  /  TBili  0.6  /  DBili  x   /  AST  19  /  ALT  21  /  AlkPhos  51        CARDIAC MARKERS ( 2018 06:25 )  <.017 ng/mL / x     / x     / x     / x      CARDIAC MARKERS ( 2018 15:00 )  <.017 ng/mL / x     / x     / x     / x        Lactic Acid Trend  18 @ 22:20   -   1.6  18 @ 14:40   -   2.9<H>  18 @ 11:00   -   4.6<HH>  18 @ 09:10   -   7.1<HH>      PT/INR - ( 2018 09:10 )   PT: 14.6 sec;   INR: 1.29 ratio         PTT - ( 2018 09:10 )  PTT:29.9 sec  Urinalysis Basic - ( 2018 09:10 )    Color: Yellow / Appearance: slightly cloudy / S.015 / pH: x  Gluc: x / Ketone: Trace  / Bili: Moderate / Urobili: Negative   Blood: x / Protein: 30 mg/dL / Nitrite: Negative   Leuk Esterase: Trace / RBC: 0-4 /HPF / WBC 0-2 /HPF   Sq Epi: x / Non Sq Epi: Moderate / Bacteria: Moderate /HPF              CULTURES: (if applicable)          CAPILLARY BLOOD GLUCOSE      POCT Blood Glucose.: 137 mg/dL (2018 06:19)      RADIOLOGY  CXR:  pending    CT:  < from: CT Abdomen and Pelvis w/ Oral Cont (18 @ 11:22) >    IMPRESSION:     Diffusely dilated fluid-filled small and large bowel to the level of the splenic flexor. No definite evidence of transition zone in the small or large bowel. Mildly distended descending colon sigmoid colon and rectum with fluid and stool. Findings may represent a nonspecific ileus versus ileal colitis versus unclear etiology. A mild stricture of the proximal descending colon cannot entirely be excluded.    Thickening of the distal esophagus, correlate foresophagitis    Question nodularity along the second portion of the duodenum, nonspecific in nature, upper endoscopy can be obtained for further evaluation    Colonic diverticulosis without definite diverticulitis.      < end of copied text >    ECHO:  performed pending results    VITALS:  T(C): 37 (18 @ 05:00), Max: 38.8 (18 18:00)  T(F): 98.6 (18 05:00), Max: 101.8 (18 18:00)  HR: 79 (18 08:00) (76 - 135)  BP: 111/56 (18 08:00) (66/42 - 129/69)  BP(mean): 74 (18 08:) (69 - 92)  ABP: --  ABP(mean): --  RR: 24 (18 08:00) (20 - 36)  SpO2: 98% (18:) (94% - 99%)  CVP(mm Hg): --  CVP(cm H2O): --    Ins and Outs     18 @ 07:01  -  18 @ 07:00  --------------------------------------------------------  IN: 5119.8 mL / OUT: 550 mL / NET: 4569.8 mL    18 @ 07:01  -  18 @ 08:17  --------------------------------------------------------  IN: 130 mL / OUT: 0 mL / NET: 130 mL        Height (cm): 162.56 (18 13:32)  Weight (kg): 95 (18 13:32)  BMI (kg/m2): 35.9 (18 13:32)

## 2018-11-26 NOTE — PROGRESS NOTE ADULT - SUBJECTIVE AND OBJECTIVE BOX
Dr. Breaux asked me to take over care of patient.    patient looks well  had diarrhea, no abdominal pain  vitals stable    afebrile    bp 96/70    abdomen-large midline reducible incisional hernia  non distended, non tender      labs:  Complete Blood Count in AM (11.26.18 @ 06:25)    WBC Count: 6.9 K/uL    RBC Count: 3.69 M/uL    Hemoglobin: 11.6 g/dL    Hematocrit: 34.5 %    Mean Cell Volume: 93.5 fl    Mean Cell Hemoglobin: 31.5 pg    Mean Cell Hemoglobin Conc: 33.7 gm/dL    Red Cell Distrib Width: 11.8 %    Platelet Count - Automated: 188 K/uL

## 2018-11-26 NOTE — PROGRESS NOTE ADULT - ASSESSMENT
Physical Examination:  GENERAL:               Alert, Oriented, No acute distress.    HEENT:                  Symmetric. No JVD, Dry MM, No Stridor  PULM:                     Bilateral air entry,, no significant sputum production, No Rales, No Rhonchi, No Wheezing  CVS:                         S1, S2,  No Murmur  ABD:                        Soft, nondistended, nontender, normoactive bowel sounds, + ventral hernia, reducible   EXT:                         No edema, nontender, No Cyanosis or Clubbing   Vascular:                Warm Extremities, Normal Capillary refill, Normal Distal Pulses  SKIN:                       Warm and well perfused, no rashes noted.   NEURO:                  Alert, oriented, interactive, nonfocal, follows commands  PSYC:                      Calm, + Insight.      Assessment  Super SIRS - due to Small bowel stricture and severe dehydration  RAJ due to hypovolemia - Resolving with Fluids  Lactic acidosis combination of dehydration and  exam not consistent to mesenteric ischemia now resolved  new onset Afib currently on Cardizem drip  UTI more likely now as pt had fever overnight  Underlying HTN, High chol, DM2    Plan  Continue IVF  F/u AM abd xray  Case d/w Surg, can advance diet  D/C Cardizem drip, start cardizem oral, Cardio eval, f/u Echo, may need to start long term a/c  epimeric abx for UTI, pending urine culture, ID Eval  insulin ss     PMD:		Juanita		                   Notified(Date): msg left on phone 11/26  Family Updated: 		Family                                 Date: 11/25      Sedation & Analgesia:	n/a  Diet/Nutrition:		diet as per surg  GI PPx:			PPI    DVT Ppx:		Hep sq  	RISK                                                          Points  	[ ] Previous VTE                                           	3  	[ ] Thrombophilia                                        	2  	[ ] Lower limb paralysis                              	2   	[ ] Current Cancer                                       	2   	[ ] Immobilization > 24 hrs                        	1  	[x ] ICU/CCU stay > 24 hours                       	1  	[x ] Age > 60                                                   	1  		Total:[2 ]      Activity:		               OOB,  Head of Bed:               35-45 deg  Glycemic Control:       Insulin ss     Lines:   PIV  CENTRAL LINE: 	[ ] YES [ x] NO	                    LOCATION:   	                       DATE INSERTED:   	                    REMOVE:  [ ] YES [ ] NO    A-LINE:  	                [ ] YES [ x] NO                      LOCATION:   	                       DATE INSERTED: 		            REMOVE:  [ ] YES [ ] NO    NEWELL: 		        [ ] YES [ ] NO  		                                       DATE INSERTED:		            REMOVE:  [ ] YES [x ] NO      Restraints were deemed necessary to prevent removal of life-sustaining devices [  ] YES   [   x ]  NO    Disposition: ICU monitoring for one more day    Goals of Care: Full copde Physical Examination:  GENERAL:               Alert, Oriented, No acute distress.    HEENT:                  Symmetric. No JVD, Dry MM, No Stridor  PULM:                     Bilateral air entry,, no significant sputum production, No Rales, No Rhonchi, No Wheezing  CVS:                         S1, S2,  No Murmur  ABD:                        Soft, nondistended, nontender, normoactive bowel sounds, + ventral hernia, reducible   EXT:                         No edema, nontender, No Cyanosis or Clubbing   Vascular:                Warm Extremities, Normal Capillary refill, Normal Distal Pulses  SKIN:                       Warm and well perfused, no rashes noted.   NEURO:                  Alert, oriented, interactive, nonfocal, follows commands  PSYC:                      Calm, + Insight.      Assessment  Severe SIRS - due to Small bowel stricture and severe dehydration  RAJ due to hypovolemia - Resolving with Fluids  Lactic acidosis combination of dehydration and  exam not consistent to mesenteric ischemia now resolved  new onset Afib currently on Cardizem drip  UTI more likely now as pt had fever overnight  Underlying HTN, High chol, DM2    Plan  Continue IVF  F/u AM abd xray  Case d/w Surg, can advance diet  D/C Cardizem drip, start cardizem oral, Cardio eval, f/u Echo, may need to start long term a/c  epimeric abx for UTI, pending urine culture, ID Eval  insulin ss     PMD:		Juanita		                   Notified(Date): msg left on phone 11/26  Family Updated: 		Family                                 Date: 11/25      Sedation & Analgesia:	n/a  Diet/Nutrition:		diet as per surg  GI PPx:			PPI    DVT Ppx:		Hep sq  	RISK                                                          Points  	[ ] Previous VTE                                           	3  	[ ] Thrombophilia                                        	2  	[ ] Lower limb paralysis                              	2   	[ ] Current Cancer                                       	2   	[ ] Immobilization > 24 hrs                        	1  	[x ] ICU/CCU stay > 24 hours                       	1  	[x ] Age > 60                                                   	1  		Total:[2 ]      Activity:		               OOB,  Head of Bed:               35-45 deg  Glycemic Control:       Insulin ss     Lines:   PIV  CENTRAL LINE: 	[ ] YES [ x] NO	                    LOCATION:   	                       DATE INSERTED:   	                    REMOVE:  [ ] YES [ ] NO    A-LINE:  	                [ ] YES [ x] NO                      LOCATION:   	                       DATE INSERTED: 		            REMOVE:  [ ] YES [ ] NO    NEWELL: 		        [ ] YES [ ] NO  		                                       DATE INSERTED:		            REMOVE:  [ ] YES [x ] NO      Restraints were deemed necessary to prevent removal of life-sustaining devices [  ] YES   [   x ]  NO    Disposition: ICU monitoring for one more day    Goals of Care: Full copde

## 2018-11-26 NOTE — PROGRESS NOTE ADULT - ASSESSMENT
ROS: Denies chest pain, headache, dysuria, sob. endorses generalized malaise and some urinary hesitancy.    On 125mL of LR/hr  Gen: Resting in bed, chatting with family in no apparent distress  Neuro: No overt deficit appreciated, PERRL.  Pulm: Good inspiratory effort, no adventitious breath sounds appreciated  Cardiac: irregular  Ab: Hyperactive +bs x4, significant central distention, soft with diffuse mild tenderness. pt states "several" loose BMs in the last 24hrs  Ex: No edema appreciated, +pedal pulses    --Will need colindres if unable to urinate, monitor for now  --Pain management  --Repeat abdomen xray if distention worsens ROS: Denies chest pain, headache, dysuria, sob. endorses generalized malaise and some urinary hesitancy.    On 125mL of LR/hr  Gen: Resting in bed, chatting with family in no apparent distress  Neuro: No overt deficit appreciated, PERRL.  Pulm: Good inspiratory effort, no adventitious breath sounds appreciated  Cardiac: irregular  Ab: Hyperactive +bs x4, significant central distention, soft with diffuse mild tenderness. pt states "several" loose BMs in the last 24hrs  Ex: No edema appreciated, +pedal pulses    --Will need colindres if unable to urinate, monitor for now  --Pain management  --Repeat abdomen xray if distention worsens   --Change diet from clear to carb consistent (pt diabetic) clears.

## 2018-11-26 NOTE — PROGRESS NOTE ADULT - SUBJECTIVE AND OBJECTIVE BOX
Pt is an 78yr old woman with PMHx including HTN, HLD, DM and recurrent bowel obstructions. Pt presented to the ER on  with chief complaint of abdominal pain and was found to have stricture with likely ileus (without reported obstruction), UTI with associated lactic acidosis and RAJ. Pt admitted for further management and surgical consult.    24hr: Pt with multiple loose bowel movements per report, pending c. diff.     ICU Vital Signs Last 24 Hrs  T(C): 36.8 (2018 17:00), Max: 37.6 (2018 20:00)  T(F): 98.3 (2018 17:00), Max: 99.6 (2018 20:00)  HR: 73 (2018 18:00) (72 - 110)  BP: 111/73 (2018 18:00) (91/57 - 121/60)  BP(mean): 80 (2018 18:00) (69 - 92)  ABP: --  ABP(mean): --  RR: 19 (2018 18:00) (14 - 33)  SpO2: 98% (2018 18:00) (83% - 99%)    CBC Full  -  ( 2018 06:25 )  WBC Count : 6.9 K/uL  Hemoglobin : 11.6 g/dL  Hematocrit : 34.5 %  Platelet Count - Automated : 188 K/uL  Mean Cell Volume : 93.5 fl  Mean Cell Hemoglobin : 31.5 pg  Mean Cell Hemoglobin Concentration : 33.7 gm/dL  Auto Neutrophil # : x  Auto Lymphocyte # : x  Auto Monocyte # : x  Auto Eosinophil # : x  Auto Basophil # : x  Auto Neutrophil % : x  Auto Lymphocyte % : x  Auto Monocyte % : x  Auto Eosinophil % : x  Auto Basophil % : x        137  |  106  |  34<H>  ----------------------------<  134<H>  3.7   |  20<L>  |  1.12    Ca    7.4<L>      2018 08:02  Phos  4.0       Mg     1.7         TPro  8.1  /  Alb  3.2<L>  /  TBili  0.6  /  DBili  x   /  AST  19  /  ALT  21  /  AlkPhos  51  25    CARDIAC MARKERS ( 2018 06:25 )  <.017 ng/mL / x     / x     / x     / x      CARDIAC MARKERS ( 2018 15:00 )  <.017 ng/mL / x     / x     / x     / x          LIVER FUNCTIONS - ( 2018 09:10 )  Alb: 3.2 g/dL / Pro: 8.1 g/dL / ALK PHOS: 51 U/L / ALT: 21 U/L DA / AST: 19 U/L / GGT: x             Culture - Urine (collected 2018 09:30)  Source: .Urine Clean Catch (Midstream)  Final Report (2018 11:45):    No growth    Culture - Blood (collected 2018 09:10)  Source: .Blood Blood-Peripheral  Preliminary Report (2018 13:01):    No growth to date.    Culture - Blood (collected 2018 09:10)  Source: .Blood Blood-Peripheral  Preliminary Report (2018 13:01):    No growth to date.      Urinalysis Basic - ( 2018 09:10 )    Color: Yellow / Appearance: slightly cloudy / S.015 / pH: x  Gluc: x / Ketone: Trace  / Bili: Moderate / Urobili: Negative   Blood: x / Protein: 30 mg/dL / Nitrite: Negative   Leuk Esterase: Trace / RBC: 0-4 /HPF / WBC 0-2 /HPF   Sq Epi: x / Non Sq Epi: Moderate / Bacteria: Moderate /HPF    MEDICATIONS  (STANDING):  cefTRIAXone   IVPB      cefTRIAXone   IVPB 1 Gram(s) IV Intermittent every 24 hours  dextrose 5%. 1000 milliLiter(s) (50 mL/Hr) IV Continuous <Continuous>  dextrose 50% Injectable 12.5 Gram(s) IV Push once  dextrose 50% Injectable 25 Gram(s) IV Push once  dextrose 50% Injectable 25 Gram(s) IV Push once  diltiazem    Tablet 30 milliGRAM(s) Oral every 6 hours  heparin  Injectable 5000 Unit(s) SubCutaneous every 8 hours  insulin lispro (HumaLOG) corrective regimen sliding scale   SubCutaneous Before meals and at bedtime  lactated ringers. 1000 milliLiter(s) (125 mL/Hr) IV Continuous <Continuous>  pantoprazole  Injectable 40 milliGRAM(s) IV Push daily    MEDICATIONS  (PRN):  acetaminophen  Suppository .. 650 milliGRAM(s) Rectal every 6 hours PRN Temp greater or equal to 38C (100.4F)  dextrose 40% Gel 15 Gram(s) Oral once PRN Blood Glucose LESS THAN 70 milliGRAM(s)/deciliter  glucagon  Injectable 1 milliGRAM(s) IntraMuscular once PRN Glucose LESS THAN 70 milligrams/deciliter Pt is an 78yr old woman with PMHx including HTN, HLD, DM and recurrent bowel obstructions. Pt presented to the ER on  with chief complaint of abdominal pain and was found to have stricture with likely ileus (without reported obstruction), UTI with associated lactic acidosis and RAJ. Pt admitted for further management and surgical consult.    24hr: Pt with multiple loose bowel movements per report, pending c. diff.  Afib with rvr, cardizem drip --> PO cardizem.     ICU Vital Signs Last 24 Hrs  T(C): 36.8 (2018 17:00), Max: 37.6 (2018 20:00)  T(F): 98.3 (2018 17:00), Max: 99.6 (2018 20:00)  HR: 73 (2018 18:00) (72 - 110)  BP: 111/73 (2018 18:00) (91/57 - 121/60)  BP(mean): 80 (2018 18:00) (69 - 92)  ABP: --  ABP(mean): --  RR: 19 (2018 18:00) (14 - 33)  SpO2: 98% (2018 18:00) (83% - 99%)    CBC Full  -  ( 2018 06:25 )  WBC Count : 6.9 K/uL  Hemoglobin : 11.6 g/dL  Hematocrit : 34.5 %  Platelet Count - Automated : 188 K/uL  Mean Cell Volume : 93.5 fl  Mean Cell Hemoglobin : 31.5 pg  Mean Cell Hemoglobin Concentration : 33.7 gm/dL  Auto Neutrophil # : x  Auto Lymphocyte # : x  Auto Monocyte # : x  Auto Eosinophil # : x  Auto Basophil # : x  Auto Neutrophil % : x  Auto Lymphocyte % : x  Auto Monocyte % : x  Auto Eosinophil % : x  Auto Basophil % : x        137  |  106  |  34<H>  ----------------------------<  134<H>  3.7   |  20<L>  |  1.12    Ca    7.4<L>      2018 08:02  Phos  4.0       Mg     1.7         TPro  8.1  /  Alb  3.2<L>  /  TBili  0.6  /  DBili  x   /  AST  19  /  ALT  21  /  AlkPhos  51  11-25    CARDIAC MARKERS ( 2018 06:25 )  <.017 ng/mL / x     / x     / x     / x      CARDIAC MARKERS ( 2018 15:00 )  <.017 ng/mL / x     / x     / x     / x          LIVER FUNCTIONS - ( 2018 09:10 )  Alb: 3.2 g/dL / Pro: 8.1 g/dL / ALK PHOS: 51 U/L / ALT: 21 U/L DA / AST: 19 U/L / GGT: x             Culture - Urine (collected 2018 09:30)  Source: .Urine Clean Catch (Midstream)  Final Report (2018 11:45):    No growth    Culture - Blood (collected 2018 09:10)  Source: .Blood Blood-Peripheral  Preliminary Report (2018 13:01):    No growth to date.    Culture - Blood (collected 2018 09:10)  Source: .Blood Blood-Peripheral  Preliminary Report (2018 13:01):    No growth to date.      Urinalysis Basic - ( 2018 09:10 )    Color: Yellow / Appearance: slightly cloudy / S.015 / pH: x  Gluc: x / Ketone: Trace  / Bili: Moderate / Urobili: Negative   Blood: x / Protein: 30 mg/dL / Nitrite: Negative   Leuk Esterase: Trace / RBC: 0-4 /HPF / WBC 0-2 /HPF   Sq Epi: x / Non Sq Epi: Moderate / Bacteria: Moderate /HPF    MEDICATIONS  (STANDING):  cefTRIAXone   IVPB      cefTRIAXone   IVPB 1 Gram(s) IV Intermittent every 24 hours  dextrose 5%. 1000 milliLiter(s) (50 mL/Hr) IV Continuous <Continuous>  dextrose 50% Injectable 12.5 Gram(s) IV Push once  dextrose 50% Injectable 25 Gram(s) IV Push once  dextrose 50% Injectable 25 Gram(s) IV Push once  diltiazem    Tablet 30 milliGRAM(s) Oral every 6 hours  heparin  Injectable 5000 Unit(s) SubCutaneous every 8 hours  insulin lispro (HumaLOG) corrective regimen sliding scale   SubCutaneous Before meals and at bedtime  lactated ringers. 1000 milliLiter(s) (125 mL/Hr) IV Continuous <Continuous>  pantoprazole  Injectable 40 milliGRAM(s) IV Push daily    MEDICATIONS  (PRN):  acetaminophen  Suppository .. 650 milliGRAM(s) Rectal every 6 hours PRN Temp greater or equal to 38C (100.4F)  dextrose 40% Gel 15 Gram(s) Oral once PRN Blood Glucose LESS THAN 70 milliGRAM(s)/deciliter  glucagon  Injectable 1 milliGRAM(s) IntraMuscular once PRN Glucose LESS THAN 70 milligrams/deciliter

## 2018-11-27 ENCOUNTER — TRANSCRIPTION ENCOUNTER (OUTPATIENT)
Age: 78
End: 2018-11-27

## 2018-11-27 LAB
ANION GAP SERPL CALC-SCNC: 12 MMOL/L — SIGNIFICANT CHANGE UP (ref 5–17)
BUN SERPL-MCNC: 21 MG/DL — SIGNIFICANT CHANGE UP (ref 7–23)
CALCIUM SERPL-MCNC: 7.7 MG/DL — LOW (ref 8.4–10.5)
CHLORIDE SERPL-SCNC: 102 MMOL/L — SIGNIFICANT CHANGE UP (ref 96–108)
CO2 SERPL-SCNC: 20 MMOL/L — LOW (ref 22–31)
CREAT SERPL-MCNC: 0.82 MG/DL — SIGNIFICANT CHANGE UP (ref 0.5–1.3)
GLUCOSE BLDC GLUCOMTR-MCNC: 126 MG/DL — HIGH (ref 70–99)
GLUCOSE BLDC GLUCOMTR-MCNC: 129 MG/DL — HIGH (ref 70–99)
GLUCOSE BLDC GLUCOMTR-MCNC: 134 MG/DL — HIGH (ref 70–99)
GLUCOSE BLDC GLUCOMTR-MCNC: 141 MG/DL — HIGH (ref 70–99)
GLUCOSE SERPL-MCNC: 146 MG/DL — HIGH (ref 70–99)
HCT VFR BLD CALC: 31.9 % — LOW (ref 34.5–45)
HGB BLD-MCNC: 10.9 G/DL — LOW (ref 11.5–15.5)
MAGNESIUM SERPL-MCNC: 1.9 MG/DL — SIGNIFICANT CHANGE UP (ref 1.6–2.6)
MCHC RBC-ENTMCNC: 31.3 PG — SIGNIFICANT CHANGE UP (ref 27–34)
MCHC RBC-ENTMCNC: 34.1 GM/DL — SIGNIFICANT CHANGE UP (ref 32–36)
MCV RBC AUTO: 91.8 FL — SIGNIFICANT CHANGE UP (ref 80–100)
PHOSPHATE SERPL-MCNC: 2.6 MG/DL — SIGNIFICANT CHANGE UP (ref 2.5–4.5)
PLATELET # BLD AUTO: 181 K/UL — SIGNIFICANT CHANGE UP (ref 150–400)
POTASSIUM SERPL-MCNC: 3.8 MMOL/L — SIGNIFICANT CHANGE UP (ref 3.5–5.3)
POTASSIUM SERPL-SCNC: 3.8 MMOL/L — SIGNIFICANT CHANGE UP (ref 3.5–5.3)
RBC # BLD: 3.48 M/UL — LOW (ref 3.8–5.2)
RBC # FLD: 11.8 % — SIGNIFICANT CHANGE UP (ref 10.3–14.5)
SODIUM SERPL-SCNC: 134 MMOL/L — LOW (ref 135–145)
WBC # BLD: 9.1 K/UL — SIGNIFICANT CHANGE UP (ref 3.8–10.5)
WBC # FLD AUTO: 9.1 K/UL — SIGNIFICANT CHANGE UP (ref 3.8–10.5)

## 2018-11-27 RX ORDER — ZOLPIDEM TARTRATE 10 MG/1
5 TABLET ORAL AT BEDTIME
Qty: 0 | Refills: 0 | Status: DISCONTINUED | OUTPATIENT
Start: 2018-11-27 | End: 2018-11-27

## 2018-11-27 RX ORDER — PANTOPRAZOLE SODIUM 20 MG/1
40 TABLET, DELAYED RELEASE ORAL
Qty: 0 | Refills: 0 | Status: DISCONTINUED | OUTPATIENT
Start: 2018-11-27 | End: 2018-11-29

## 2018-11-27 RX ORDER — INSULIN LISPRO 100/ML
VIAL (ML) SUBCUTANEOUS AT BEDTIME
Qty: 0 | Refills: 0 | Status: DISCONTINUED | OUTPATIENT
Start: 2018-11-27 | End: 2018-11-29

## 2018-11-27 RX ORDER — SOD SULF/SODIUM/NAHCO3/KCL/PEG
1000 SOLUTION, RECONSTITUTED, ORAL ORAL ONCE
Qty: 0 | Refills: 0 | Status: COMPLETED | OUTPATIENT
Start: 2018-11-27 | End: 2018-11-27

## 2018-11-27 RX ORDER — INSULIN LISPRO 100/ML
VIAL (ML) SUBCUTANEOUS
Qty: 0 | Refills: 0 | Status: DISCONTINUED | OUTPATIENT
Start: 2018-11-27 | End: 2018-11-29

## 2018-11-27 RX ORDER — SOD SULF/SODIUM/NAHCO3/KCL/PEG
1000 SOLUTION, RECONSTITUTED, ORAL ORAL ONCE
Qty: 0 | Refills: 0 | Status: COMPLETED | OUTPATIENT
Start: 2018-11-28 | End: 2018-11-28

## 2018-11-27 RX ADMIN — HEPARIN SODIUM 5000 UNIT(S): 5000 INJECTION INTRAVENOUS; SUBCUTANEOUS at 14:53

## 2018-11-27 RX ADMIN — HEPARIN SODIUM 5000 UNIT(S): 5000 INJECTION INTRAVENOUS; SUBCUTANEOUS at 05:58

## 2018-11-27 RX ADMIN — ZOLPIDEM TARTRATE 5 MILLIGRAM(S): 10 TABLET ORAL at 22:05

## 2018-11-27 RX ADMIN — CEFTRIAXONE 100 GRAM(S): 500 INJECTION, POWDER, FOR SOLUTION INTRAMUSCULAR; INTRAVENOUS at 13:14

## 2018-11-27 RX ADMIN — HEPARIN SODIUM 5000 UNIT(S): 5000 INJECTION INTRAVENOUS; SUBCUTANEOUS at 23:01

## 2018-11-27 RX ADMIN — ZOLPIDEM TARTRATE 5 MILLIGRAM(S): 10 TABLET ORAL at 23:01

## 2018-11-27 RX ADMIN — SODIUM CHLORIDE 125 MILLILITER(S): 9 INJECTION, SOLUTION INTRAVENOUS at 15:51

## 2018-11-27 RX ADMIN — Medication 1000 MILLILITER(S): at 18:57

## 2018-11-27 RX ADMIN — SODIUM CHLORIDE 125 MILLILITER(S): 9 INJECTION, SOLUTION INTRAVENOUS at 05:57

## 2018-11-27 NOTE — PHARMACOTHERAPY INTERVENTION NOTE - COMMENTS
Recent Tx for UTI  Here with abdom pain, transient diarrhea and fever  Diarrhea and fever resolved, WBC normal, hemodyn stable, Cxs negative, Cdiff negative  No sign of infection at present  CT ileus vs enteritis  as per ID - Given clinical improvement, negative Micro data thus far, will d/c CTX

## 2018-11-27 NOTE — PROGRESS NOTE ADULT - ASSESSMENT
not completely resolved bowel obs    dr fajardo to evaluate  possible colonoscopy for desc colon stricture

## 2018-11-27 NOTE — PROGRESS NOTE ADULT - ASSESSMENT
Physical Examination:  GENERAL:               Alert, Oriented, No acute distress.    HEENT:                  Symmetric. No JVD, Dry MM, No Stridor  PULM:                     Bilateral air entry,, no significant sputum production, No Rales, No Rhonchi, No Wheezing  CVS:                         S1, S2,  No Murmur  ABD:                        Soft, nondistended, nontender, normoactive bowel sounds, + ventral hernia, reducible   EXT:                         No edema, nontender, No Cyanosis or Clubbing   Vascular:                Warm Extremities, Normal Capillary refill, Normal Distal Pulses  SKIN:                       Warm and well perfused, no rashes noted.   NEURO:                  Alert, oriented, interactive, nonfocal, follows commands  PSYC:                      Calm, + Insight.      Assessment  Severe SIRS - due to Small bowel stricture and severe dehydration  RAJ due to hypovolemia - Resolved  Lactic acidosis combination of dehydration/ hypovolemia and metformin use ; exam not consistent to mesenteric ischemia now resolved  new onset Afib currently on Cardizem oral, currently not anticoagulated.   ? UTI , currently off abx  Underlying HTN, High chol, DM2    Plan  d/c IVF  surg f/u to advance diet  Cardizem oral, Cardio f/u ? role in long term a/c vs acute event from SIRS.   off abx as per ID  insulin ss   GI Eval, dr Carrillo called.    PMD:		Juanita		                   Notified(Date): 11/26  Family Updated: 		Family                                 Date: 11/27      Sedation & Analgesia:	n/a  Diet/Nutrition:		diet as per surg  GI PPx:			PPI    DVT Ppx:		Hep sq    Activity:		               OOB,  Head of Bed:               35-45 deg  Glycemic Control:       Insulin ss     Lines:   PIV      Restraints were deemed necessary to prevent removal of life-sustaining devices [  ] YES   [   x ]  NO    Disposition: can transfer to [facility], case d/w Dr. Barber who accepts pt upon transfer    Goals of Care: Full code

## 2018-11-27 NOTE — PROGRESS NOTE ADULT - ASSESSMENT
Mult abdom surgeries, hernia repairs  Recent Tx for UTI  Here with abdom pain, transient diarrhea and fever  Diarrhea and fever resolved, WBC normal, hemodyn stable, Cxs negative, Cdiff negative  No sign of infection at present  CT ileus vs enteritis    Plan:  Given clinical improvement, negative Micro data thus far, will d/c CTX  D/w pt and brother

## 2018-11-27 NOTE — PROGRESS NOTE ADULT - SUBJECTIVE AND OBJECTIVE BOX
Follow-up Critical Care Progress Note  Chief Complaint : Partial intestinal obstruction      Patient seen and examined  pt feeling better, no cp, palp, n/v  tolerating diet  hr remains stable sinus      Allergies :No Known Allergies      PAST MEDICAL & SURGICAL HISTORY:  Type 2 diabetes mellitus without complication, unspecified whether long term insulin use  Incontinence in female  Hyperlipidemia, unspecified hyperlipidemia type  Hypertension, unspecified type  Other age-related cataract of both eyes  H/O inguinal hernia  H/O umbilical hernia repair      Medications:  MEDICATIONS  (STANDING):  cefTRIAXone   IVPB      cefTRIAXone   IVPB 1 Gram(s) IV Intermittent every 24 hours  dextrose 5%. 1000 milliLiter(s) (50 mL/Hr) IV Continuous <Continuous>  dextrose 50% Injectable 12.5 Gram(s) IV Push once  dextrose 50% Injectable 25 Gram(s) IV Push once  dextrose 50% Injectable 25 Gram(s) IV Push once  diltiazem    Tablet 30 milliGRAM(s) Oral every 6 hours  heparin  Injectable 5000 Unit(s) SubCutaneous every 8 hours  insulin lispro (HumaLOG) corrective regimen sliding scale   SubCutaneous three times a day before meals  insulin lispro (HumaLOG) corrective regimen sliding scale   SubCutaneous at bedtime  lactated ringers. 1000 milliLiter(s) (125 mL/Hr) IV Continuous <Continuous>  melatonin 6 milliGRAM(s) Oral at bedtime  pantoprazole    Tablet 40 milliGRAM(s) Oral before breakfast    MEDICATIONS  (PRN):  acetaminophen  Suppository .. 650 milliGRAM(s) Rectal every 6 hours PRN Temp greater or equal to 38C (100.4F)  dextrose 40% Gel 15 Gram(s) Oral once PRN Blood Glucose LESS THAN 70 milliGRAM(s)/deciliter  glucagon  Injectable 1 milliGRAM(s) IntraMuscular once PRN Glucose LESS THAN 70 milligrams/deciliter      LABS:                        10.9   9.1   )-----------( 181      ( 27 Nov 2018 06:00 )             31.9     11-27    134<L>  |  102  |  21  ----------------------------<  146<H>  3.8   |  20<L>  |  0.82    Ca    7.7<L>      27 Nov 2018 06:00  Phos  2.6     11-27  Mg     1.9     11-27        CARDIAC MARKERS ( 26 Nov 2018 06:25 )  <.017 ng/mL / x     / x     / x     / x      CARDIAC MARKERS ( 25 Nov 2018 15:00 )  <.017 ng/mL / x     / x     / x     / x                        CULTURES: (if applicable)    Culture - Urine (collected 11-25-18 @ 09:30)  Source: .Urine Clean Catch (Midstream)  Final Report (11-26-18 @ 11:45):    No growth    Culture - Blood (collected 11-25-18 @ 09:10)  Source: .Blood Blood-Peripheral  Preliminary Report (11-26-18 @ 13:01):    No growth to date.    Culture - Blood (collected 11-25-18 @ 09:10)  Source: .Blood Blood-Peripheral  Preliminary Report (11-26-18 @ 13:01):    No growth to date.            CAPILLARY BLOOD GLUCOSE      POCT Blood Glucose.: 129 mg/dL (27 Nov 2018 08:09)      RADIOLOGY  < from: Xray Kidney Ureter Bladder (11.26.18 @ 11:52) >  Comparison CT performed the prior day    Small bowel gaseous distention previously described is not significantly   changed given differences in technique. There is a possibility of gas in   the large bowel. The stomach is contracted. 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    < end of copied text >    < from: TTE Echo Complete w/Doppler (11.26.18 @ 07:47) >  Summary:   1. Mild calcific aortic stenosis   2. Mitral annular calcification   3. Left ventricular ejection fraction, by visual estimation, is 67%.   4. Normal global left ventricular systolic function.   5. The left ventricular diastolic function could not be assessed in this study.   6. Mild mitral and tricuspid insufficiencies   7. Thickening and calcification of theanterior and posterior mitral valve leaflets.    < end of copied text >        VITALS:  T(C): 36.7 (11-27-18 @ 08:00), Max: 37 (11-26-18 @ 20:00)  T(F): 98.1 (11-27-18 @ 08:00), Max: 98.6 (11-26-18 @ 20:00)  HR: 63 (11-27-18 @ 09:00) (62 - 83)  BP: 112/64 (11-27-18 @ 09:00) (103/54 - 144/55)  BP(mean): 80 (11-27-18 @ 09:00) (69 - 89)  ABP: --  ABP(mean): --  RR: 21 (11-27-18 @ 09:00) (14 - 28)  SpO2: 97% (11-27-18 @ 09:00) (93% - 99%)  CVP(mm Hg): --  CVP(cm H2O): --    Ins and Outs     11-26-18 @ 07:01  -  11-27-18 @ 07:00  --------------------------------------------------------  IN: 4190 mL / OUT: 0 mL / NET: 4190 mL    11-27-18 @ 07:01  -  11-27-18 @ 10:00  --------------------------------------------------------  IN: 730 mL / OUT: 0 mL / NET: 730 mL        Height (cm): 162.56 (11-25-18 @ 13:32)  Weight (kg): 95 (11-25-18 @ 13:32)  BMI (kg/m2): 35.9 (11-25-18 @ 13:32)

## 2018-11-27 NOTE — CONSULT NOTE ADULT - SUBJECTIVE AND OBJECTIVE BOX
Chief Complaint: abdominal pain and diarrhea    HPI: This is an 78 yr old woman with a hx of chronic abdominal pain who came in yesterday because her symptoms worsened. She was found to be in af with rvr which ultimately resolved. No cp no sob no palps    PMH:   Type 2 diabetes mellitus without complication, unspecified whether long term insulin use  Incontinence in female  Hyperlipidemia, unspecified hyperlipidemia type  Hypertension, unspecified type    PSH:   Other age-related cataract of both eyes  H/O inguinal hernia  H/O umbilical hernia repair  No significant past surgical history    Family History:  FAMILY HISTORY:father and mother with hx of mi      Social History:  Smoking:no  Alcohol:no  Drugs:no    Allergies:  No Known Allergies      Medications:  acetaminophen  Suppository .. 650 milliGRAM(s) Rectal every 6 hours PRN  cefTRIAXone   IVPB      cefTRIAXone   IVPB 1 Gram(s) IV Intermittent every 24 hours  dextrose 40% Gel 15 Gram(s) Oral once PRN  dextrose 5%. 1000 milliLiter(s) IV Continuous <Continuous>  dextrose 50% Injectable 12.5 Gram(s) IV Push once  dextrose 50% Injectable 25 Gram(s) IV Push once  dextrose 50% Injectable 25 Gram(s) IV Push once  diltiazem    Tablet 30 milliGRAM(s) Oral every 6 hours  diltiazem Infusion 10 mG/Hr IV Continuous <Continuous>  glucagon  Injectable 1 milliGRAM(s) IntraMuscular once PRN  heparin  Injectable 5000 Unit(s) SubCutaneous every 8 hours  insulin lispro (HumaLOG) corrective regimen sliding scale   SubCutaneous every 6 hours  lactated ringers. 1000 milliLiter(s) IV Continuous <Continuous>  magnesium sulfate  IVPB 1 Gram(s) IV Intermittent every 1 hour  pantoprazole  Injectable 40 milliGRAM(s) IV Push daily  potassium chloride  10 mEq/100 mL IVPB 10 milliEquivalent(s) IV Intermittent every 1 hour      REVIEW OF SYSTEMS:  CONSTITUTIONAL: No fever, weight loss, or fatigue  EYES: No eye pain, visual disturbances, or discharge  ENMT:  No difficulty hearing, tinnitus, vertigo; No sinus or throat pain  NECK: No pain or stiffness  BREASTS: No pain, masses, or nipple discharge  RESPIRATORY: No cough, wheezing, chills or hemoptysis; No shortness of breath  CARDIOVASCULAR: No chest pain, palpitations, dizziness, or leg swelling  GASTROINTESTINAl: see hpi  GENITOURINARY: No dysuria, frequency, hematuria, or incontinence  NEUROLOGICAL: No headaches, memory loss, loss of strength, numbness, or tremors  SKIN: No itching, burning, rashes, or lesions   LYMPH NODES: No enlarged glands  ENDOCRINE: No heat or cold intolerance; No hair loss  MUSCULOSKELETAL: No joint pain or swelling; No muscle, back, or extremity pain  PSYCHIATRIC: No depression, anxiety, mood swings, or difficulty sleeping  HEME/LYMPH: No easy bruising, or bleeding gums  ALLERY AND IMMUNOLOGIC: No hives or eczema    Physical Exam:  T(C): 37 (11-26-18 @ 05:00), Max: 38.8 (11-25-18 @ 18:00)  HR: 79 (11-26-18 @ 08:00) (76 - 135)  BP: 111/56 (11-26-18 @ 08:00) (89/75 - 120/64)  RR: 24 (11-26-18 @ 08:00) (20 - 36)  SpO2: 98% (11-26-18 @ 08:00) (94% - 99%)  Wt(kg): --    GENERAL: NAD, well-groomed, well-developed  HEAD:  Atraumatic, Normocephalic  EYES: EOMI, conjunctiva and sclera clear  ENT: Moist mucous membranes,  NECK: Supple, No JVD, no bruits  CHEST/LUNG: Clear to percussion bilaterally; No rales, rhonchi, wheezing, or rubs  HEART: Regular rate and rhythm; No murmurs, rubs, or gallops PMI non displaced.  ABDOMEN: Soft, Nontender, Nondistended; Bowel sounds present  EXTREMITIES:  2+ Peripheral Pulses, No clubbing, cyanosis, or edema  SKIN: No rashes or lesions  NERVOUS SYSTEM:  Alert & Oriented X3, Good concentration; Motor Strength 5/5 B/L upper and lower extremities; DTRs 2+ intact and symmetric    Cardiovascular Diagnostic Testing:  ECG: af with rvr    Labs:                        11.6   6.9   )-----------( 188      ( 26 Nov 2018 06:25 )             34.5     11-26    137  |  106  |  34<H>  ----------------------------<  134<H>  3.7   |  20<L>  |  1.12    Ca    7.4<L>      26 Nov 2018 08:02  Phos  4.0     11-26  Mg     1.7     11-26    TPro  8.1  /  Alb  3.2<L>  /  TBili  0.6  /  DBili  x   /  AST  19  /  ALT  21  /  AlkPhos  51  11-25    PT/INR - ( 25 Nov 2018 09:10 )   PT: 14.6 sec;   INR: 1.29 ratio         PTT - ( 25 Nov 2018 09:10 )  PTT:29.9 sec  CARDIAC MARKERS ( 26 Nov 2018 06:25 )  <.017 ng/mL / x     / x     / x     / x      CARDIAC MARKERS ( 25 Nov 2018 15:00 )  <.017 ng/mL / x     / x     / x     / x              Hemoglobin A1C, Whole Blood: 6.8 % (11-26 @ 06:25)    Thyroid Stimulating Hormone, Serum: 0.46 uIU/mL (11-26 @ 06:25)      Imaging:cxr-no infiltrates
HPI:   Patient is a 78y female with history of abdomen hernia repairs, dm, recent tx for uti with keflex and macrobid who developed abrupt onset of unrelenting abdomen pain with vomiting starting 3 nights ago. She came to the hospital yesterday and was found to have partial sbo vs ileus and lactic acidosis. She was having normal bm before that but now has malodorous diarrhea. She vomited up bilious material. She had fever upon arrival but not before. She has no recent travel or imported foods. She did not eat from a whole turkey stuffed on Thanksgiving. No other household members are ill. She has had this pain in the past but it generally resolves after a short time.     REVIEW OF SYSTEMS:  All other review of systems negative (Comprehensive ROS)    PAST MEDICAL & SURGICAL HISTORY:  Type 2 diabetes mellitus without complication, unspecified whether long term insulin use  Incontinence in female  Hyperlipidemia, unspecified hyperlipidemia type  Hypertension, unspecified type  Other age-related cataract of both eyes  H/O inguinal hernia  H/O umbilical hernia repair      Allergies    No Known Allergies    Intolerances        Antimicrobials Day #  :2  cefTRIAXone   IVPB      cefTRIAXone   IVPB 1 Gram(s) IV Intermittent every 24 hours    Other Medications:  acetaminophen  Suppository .. 650 milliGRAM(s) Rectal every 6 hours PRN  dextrose 40% Gel 15 Gram(s) Oral once PRN  dextrose 5%. 1000 milliLiter(s) IV Continuous <Continuous>  dextrose 50% Injectable 12.5 Gram(s) IV Push once  dextrose 50% Injectable 25 Gram(s) IV Push once  dextrose 50% Injectable 25 Gram(s) IV Push once  diltiazem    Tablet 30 milliGRAM(s) Oral every 6 hours  diltiazem Infusion 10 mG/Hr IV Continuous <Continuous>  glucagon  Injectable 1 milliGRAM(s) IntraMuscular once PRN  heparin  Injectable 5000 Unit(s) SubCutaneous every 8 hours  insulin lispro (HumaLOG) corrective regimen sliding scale   SubCutaneous every 6 hours  lactated ringers. 1000 milliLiter(s) IV Continuous <Continuous>  magnesium sulfate  IVPB 1 Gram(s) IV Intermittent every 1 hour  pantoprazole  Injectable 40 milliGRAM(s) IV Push daily  potassium chloride  10 mEq/100 mL IVPB 10 milliEquivalent(s) IV Intermittent every 1 hour      FAMILY HISTORY:  No pertinent family history in first degree relatives      SOCIAL HISTORY:  Smoking: [ ]Yes [x ]No  ETOH: [ ]Yes [x ]No  Drug Use: [ ]Yes [x ]No   [x ] Single[ ]    T(F): 98.3 (18 @ 08:00), Max: 101.8 (18 @ 18:00)  HR: 83 (18 @ 11:00)  BP: 113/68 (18 @ 11:00)  RR: 14 (18 @ 11:00)  SpO2: 98% (18 @ 11:00)  Wt(kg): --    PHYSICAL EXAM:  General: alert, no acute distress  Eyes:  anicteric, no conjunctival injection, no discharge  Oropharynx: no lesions or injection 	  Neck: supple, without adenopathy  Lungs: clear to auscultation  Heart: regular rate and rhythm; no murmur, rubs or gallops  Abdomen: soft, reducible distended hernia,  nontender, without mass or organomegaly  Skin: no lesions  Extremities: no clubbing, cyanosis, or edema  Neurologic: alert, oriented, moves all extremities    LAB RESULTS:                        11.6   6.9   )-----------( 188      ( 2018 06:25 )             34.5         137  |  106  |  34<H>  ----------------------------<  134<H>  3.7   |  20<L>  |  1.12    Ca    7.4<L>      2018 08:02  Phos  4.0       Mg     1.7         TPro  8.1  /  Alb  3.2<L>  /  TBili  0.6  /  DBili  x   /  AST  19  /  ALT  21  /  AlkPhos  51      LIVER FUNCTIONS - ( 2018 09:10 )  Alb: 3.2 g/dL / Pro: 8.1 g/dL / ALK PHOS: 51 U/L / ALT: 21 U/L DA / AST: 19 U/L / GGT: x           Urinalysis Basic - ( 2018 09:10 )    Color: Yellow / Appearance: slightly cloudy / S.015 / pH: x  Gluc: x / Ketone: Trace  / Bili: Moderate / Urobili: Negative   Blood: x / Protein: 30 mg/dL / Nitrite: Negative   Leuk Esterase: Trace / RBC: 0-4 /HPF / WBC 0-2 /HPF   Sq Epi: x / Non Sq Epi: Moderate / Bacteria: Moderate /HPF        MICROBIOLOGY:  RECENT CULTURES:        RADIOLOGY REVIEWED:  < from: CT Abdomen and Pelvis w/ Oral Cont (18 @ 11:22) >  bilateral spondylolysis at L5.    IMPRESSION:     Diffusely dilated fluid-filled small and large bowel to the level of the   splenic flexor. No definite evidence of transition zone in the small or   large bowel. Mildly distended descending colon sigmoid colon and rectum   with fluid and stool. Findings may represent a nonspecific ileus versus   ileal colitis versus unclear etiology. A mild stricture of the proximal   descending colon cannot entirely be excluded.    Thickening of the distal esophagus, correlate foresophagitis    Question nodularity along the second portion of the duodenum, nonspecific   in nature, upper endoscopy can be obtained for further evaluation    Colonic diverticulosis without definite diverticulitis.      < end of copied text >      Impression:  patient with history of abdo hernia repairs, recent tx for uti with keflex and macrobid now with prolonged abdomen pain, lactic acidosis, ct shows ileus vs possible sbo and some colon distension. Must be concerned about transient obstruction with transient bowel ischemia related to the hernias or an infectious enterocolitis like campylobacter, salmonella, shigella, pathogenic ecoli sp, even cdif. She seems better after aggressive volume and ceftriaxone  Recommendations:  will not stop ceftiaxone yest  will check stool pcr panel and cdif  continue supportive care  follow up blood cultures
HPI: Patient is an 79 yo F with PMH significant for DM, HTN, HLD who present's to [facility] with c/o abdominal pain since Friday associated with nausea and vomiting. Patient states last bowel movement and flatus was Friday. Work revealed normal WBC 7, but venous lactate level initially of 7 but after resuscitation of 4. Patient states improvement of the abdominal pain on my exam. Imaging shows no signs of ischemia, but SBO vs. ileus with non-obstructed ventral hernia. Patient hasn't eaten since Friday do to the abdominal pain. Patient is currently stable and will be admitted to ICU for further management.     Vital Signs Last 24 Hrs  T(C): 37.9 (2018 12:00), Max: 37.9 (2018 12:00)  T(F): 100.3 (2018 12:00), Max: 100.3 (2018 12:00)  HR: 103 (2018 12:45) (99 - 134)  BP: 105/69 (2018 12:45) (66/42 - 129/69)  BP(mean): --  RR: 20 (2018 12:45) (20 - 31)  SpO2: 97% (2018 12:45) (94% - 98%)      SUBJECTIVE: Pt seen  SOB:  [ ] YES [x ] NO  Dyspnea: [ ]YES [x ]NO  Chest Discomfort: [ ] YES [x ] NO    Nausea: [x ] YES [ ] NO           Vomiting: [x ] YES [ ] NO  Flatus: [ ] YES [x ] NO             Bowel Movement: [ ] YES [x ] NO  Pain (0-10):  2           Pain Control Adequate: [x ] YES [ ] NO    Exam:  Gen: NAD, AAOx3  Abd: Soft, NT, mild distention, reducible large ventral hernia        I&O's Summary    I&O's Detail      MEDICATIONS  (STANDING):    MEDICATIONS  (PRN):      LABS:                        15.7   7.3   )-----------( 309      ( 2018 09:10 )             46.6         135  |  98  |  42<H>  ----------------------------<  355<H>  3.8   |  15<L>  |  2.16<H>    Ca    9.4      2018 09:10    TPro  8.1  /  Alb  3.2<L>  /  TBili  0.6  /  DBili  x   /  AST  19  /  ALT  21  /  AlkPhos  51  11-25    PT/INR - ( 2018 09:10 )   PT: 14.6 sec;   INR: 1.29 ratio         PTT - ( 2018 09:10 )  PTT:29.9 sec  Urinalysis Basic - ( 2018 09:10 )    Lactate: 7-->4.6    Color: Yellow / Appearance: slightly cloudy / S.015 / pH: x  Gluc: x / Ketone: Trace  / Bili: Moderate / Urobili: Negative   Blood: x / Protein: 30 mg/dL / Nitrite: Negative   Leuk Esterase: Trace / RBC: 0-4 /HPF / WBC 0-2 /HPF   Sq Epi: x / Non Sq Epi: Moderate / Bacteria: Moderate /HPF        RADIOLOGY & ADDITIONAL STUDIES:    CT ABDOMEN AND PELVIS OC      PROCEDURE DATE:  2018        INTERPRETATION:  Exam Type:  CT ABDOMEN AND PELVIS OC   Date and Time: 2018 11:22 AM  Indication: Abdominal pain and distention  Compared to: 2015  Technique: CT of the ABDOMEN and PELVIS:  No intravenous contrast was   administered at physician request. This limits sensitivity for certain   processes. Oral contrast was  administered.    COMMENTS:    LOWER LUNGS AND PLEURA: Coronary vascular calcification. Thickening of   the distal esophagus, correlate for esophagitis.     LIVER: within normal limits.  BILE DUCTS: normal caliber.  GALLBLADDER: Fundal gallstones and/or gallbladder calcification.           PANCREAS: within normal limits.  SPLEEN: within normal limits.  ADRENALS: Indeterminate left adrenal lesion unchanged. Right adrenal   gland is unremarkable.    KIDNEYS, URETERS AND BLADDER: Mild bilateral renal atrophy and renal   cortical scarring. No hydronephrosis. Nonspecific bilateral perinephric   stranding and trace perinephric fluid. Nonobstructive anterior left lower   pole intrarenal calculus. The ureters are unremarkable. The bladder is   within normal limits.    PELVIS: Trace pelvic free fluid. No adnexal masses. Subcentimeter pelvic   lymph nodes.       BOWEL: Question nodularity along the second portion of the duodenum,   nonspecific in nature, upper endoscopy can be obtained for further   evaluation.. Colonic diverticulosis without definite evidence of   diverticulitis. Diffusely dilated fluid-filled small and large bowel to   the level of the splenic flexor. No definite evidence of transition zone   in the small or large bowel. Mildly distended descending colon sigmoid   colon and rectum with fluid and stool. Findings may represent a   nonspecific ileus versus ileal colitis versus unclear etiology. A mild   stricture of the proximal descending colon cannot entirely be excluded.  PERITONEUM: no ascites or free air, no fluid collection.  RETROPERITONEUM: within normal limits.      VESSELS: atherosclerotic changes.      ABDOMINAL WALL: Marked weakness of the anterior abdominal wall with   protrusion of small large bowel anteriorly.  BONES: Degenerative changes. Grade 2 anterolisthesis of L5 on S1 with   bilateral spondylolysis at L5.    IMPRESSION:     Diffusely dilated fluid-filled small and large bowel to the level of the   splenic flexor. No definite evidence of transition zone in the small or   large bowel. Mildly distended descending colon sigmoid colon and rectum   with fluid and stool. Findings may represent a nonspecific ileus versus   ileal colitis versus unclear etiology. A mild stricture of the proximal   descending colon cannot entirely be excluded.    Thickening of the distal esophagus, correlate for esophagitis    Question nodularity along the second portion of the duodenum, nonspecific   in nature, upper endoscopy can be obtained for further evaluation    Colonic diverticulosis without definite diverticulitis.
Patient seen and examined.  Full consult dictated and will be available shortly.    Elderly female admitted with lower abdominal pain, nausea and diarrhea that appears to have improved.  No fever or chills.  CT Scan Abdomen shows possible stricture in descending colon. VSS.  Abdomen soft, nontender BS+    Impression: Lower abdominal pain, rule out malignant stricture/mass    Plan:  1. Colonoscopy tomorrow for further evaluation

## 2018-11-27 NOTE — PROGRESS NOTE ADULT - SUBJECTIVE AND OBJECTIVE BOX
feeling betteroob/chair tolerated po diet    looks well    abdomen-soft and nt but still mildly distended    labs:Complete Blood Count in AM (11.27.18 @ 06:00)    WBC Count: 9.1 K/uL    RBC Count: 3.48 M/uL    Hemoglobin: 10.9 g/dL    Hematocrit: 31.9 %    Mean Cell Volume: 91.8 fl    Mean Cell Hemoglobin: 31.3 pg    Mean Cell Hemoglobin Conc: 34.1 gm/dL    Red Cell Distrib Width: 11.8 %    Platelet Count - Automated: 181 K/uL

## 2018-11-27 NOTE — PROGRESS NOTE ADULT - SUBJECTIVE AND OBJECTIVE BOX
CC: f/u for abdom pain, fever    Patient reports mild abdom discomfort, no BMs, no diarrhea, no vomiting    REVIEW OF SYSTEMS:  All other review of systems negative (Comprehensive ROS)    Antimicrobials Day # 3  cefTRIAXone   IVPB      cefTRIAXone   IVPB 1 Gram(s) IV Intermittent every 24 hours    Other Medications Reviewed    T(F): 98.1 (11-27-18 @ 12:00), Max: 98.6 (11-26-18 @ 20:00)  HR: 61 (11-27-18 @ 14:00)  BP: 113/66 (11-27-18 @ 14:00)  RR: 21 (11-27-18 @ 14:00)  SpO2: 96% (11-27-18 @ 14:00)  Wt(kg): --    PHYSICAL EXAM:  General: no acute distress  Eyes:  anicteric, no conjunctival injection, no discharge  Oropharynx: no lesions or injection 	  Neck: supple, without adenopathy  Lungs: clear to auscultation  Heart: regular rate and rhythm; no murmur, rubs or gallops  Abdomen: soft, distended, nontender, without mass or organomegaly  Skin: no lesions  Extremities: no clubbing, cyanosis, or edema  Neurologic: alert, oriented, moves all extremities    LAB RESULTS:                        10.9   9.1   )-----------( 181      ( 27 Nov 2018 06:00 )             31.9     11-27    134<L>  |  102  |  21  ----------------------------<  146<H>  3.8   |  20<L>  |  0.82    Ca    7.7<L>      27 Nov 2018 06:00  Phos  2.6     11-27  Mg     1.9     11-27      MICROBIOLOGY:  RECENT CULTURES:  11-25 @ 09:30 .Urine Clean Catch (Midstream)     No growth    11-25 @ 09:10 .Blood Blood-Peripheral     No growth to date.    Clostridium difficile Toxin by PCR (11.26.18 @ 15:14)    Clostridium difficile Toxin by PCR: [facility]      RADIOLOGY REVIEWED:  CT Abdomen and Pelvis w/ Oral Cont (11.25.18 @ 11:22) >  Diffusely dilated fluid-filled small and large bowel to the level of the   splenic flexor. No definite evidence of transition zone in the small or   large bowel. Mildly distended descending colon sigmoid colon and rectum   with fluid and stool. Findings may represent a nonspecific ileus versus   ileal colitis versus unclear etiology. A mild stricture of the proximal   descending colon cannot entirely be excluded.    Thickening of the distal esophagus, correlate foresophagitis    Question nodularity along the second portion of the duodenum, nonspecific   in nature, upper endoscopy can be obtained for further evaluation    Colonic diverticulosis without definite diverticulitis.

## 2018-11-28 LAB
ANION GAP SERPL CALC-SCNC: 11 MMOL/L — SIGNIFICANT CHANGE UP (ref 5–17)
BUN SERPL-MCNC: 13 MG/DL — SIGNIFICANT CHANGE UP (ref 7–23)
CALCIUM SERPL-MCNC: 8.5 MG/DL — SIGNIFICANT CHANGE UP (ref 8.4–10.5)
CHLORIDE SERPL-SCNC: 105 MMOL/L — SIGNIFICANT CHANGE UP (ref 96–108)
CO2 SERPL-SCNC: 23 MMOL/L — SIGNIFICANT CHANGE UP (ref 22–31)
CREAT SERPL-MCNC: 0.76 MG/DL — SIGNIFICANT CHANGE UP (ref 0.5–1.3)
GLUCOSE BLDC GLUCOMTR-MCNC: 108 MG/DL — HIGH (ref 70–99)
GLUCOSE BLDC GLUCOMTR-MCNC: 140 MG/DL — HIGH (ref 70–99)
GLUCOSE SERPL-MCNC: 128 MG/DL — HIGH (ref 70–99)
HCT VFR BLD CALC: 33.9 % — LOW (ref 34.5–45)
HGB BLD-MCNC: 11.4 G/DL — LOW (ref 11.5–15.5)
MAGNESIUM SERPL-MCNC: 1.7 MG/DL — SIGNIFICANT CHANGE UP (ref 1.6–2.6)
MCHC RBC-ENTMCNC: 31.1 PG — SIGNIFICANT CHANGE UP (ref 27–34)
MCHC RBC-ENTMCNC: 33.6 GM/DL — SIGNIFICANT CHANGE UP (ref 32–36)
MCV RBC AUTO: 92.5 FL — SIGNIFICANT CHANGE UP (ref 80–100)
PHOSPHATE SERPL-MCNC: 1.9 MG/DL — LOW (ref 2.5–4.5)
PLATELET # BLD AUTO: 197 K/UL — SIGNIFICANT CHANGE UP (ref 150–400)
POTASSIUM SERPL-MCNC: 3.7 MMOL/L — SIGNIFICANT CHANGE UP (ref 3.5–5.3)
POTASSIUM SERPL-SCNC: 3.7 MMOL/L — SIGNIFICANT CHANGE UP (ref 3.5–5.3)
RBC # BLD: 3.67 M/UL — LOW (ref 3.8–5.2)
RBC # FLD: 11.7 % — SIGNIFICANT CHANGE UP (ref 10.3–14.5)
SODIUM SERPL-SCNC: 139 MMOL/L — SIGNIFICANT CHANGE UP (ref 135–145)
WBC # BLD: 8.1 K/UL — SIGNIFICANT CHANGE UP (ref 3.8–10.5)
WBC # FLD AUTO: 8.1 K/UL — SIGNIFICANT CHANGE UP (ref 3.8–10.5)

## 2018-11-28 PROCEDURE — 99233 SBSQ HOSP IP/OBS HIGH 50: CPT

## 2018-11-28 RX ORDER — POTASSIUM PHOSPHATE, MONOBASIC POTASSIUM PHOSPHATE, DIBASIC 236; 224 MG/ML; MG/ML
15 INJECTION, SOLUTION INTRAVENOUS ONCE
Qty: 0 | Refills: 0 | Status: COMPLETED | OUTPATIENT
Start: 2018-11-28 | End: 2018-11-28

## 2018-11-28 RX ORDER — DILTIAZEM HCL 120 MG
120 CAPSULE, EXT RELEASE 24 HR ORAL DAILY
Qty: 0 | Refills: 0 | Status: DISCONTINUED | OUTPATIENT
Start: 2018-11-29 | End: 2018-11-29

## 2018-11-28 RX ORDER — ZOLPIDEM TARTRATE 10 MG/1
5 TABLET ORAL AT BEDTIME
Qty: 0 | Refills: 0 | Status: DISCONTINUED | OUTPATIENT
Start: 2018-11-28 | End: 2018-11-29

## 2018-11-28 RX ORDER — MAGNESIUM SULFATE 500 MG/ML
1 VIAL (ML) INJECTION ONCE
Qty: 0 | Refills: 0 | Status: COMPLETED | OUTPATIENT
Start: 2018-11-28 | End: 2018-11-28

## 2018-11-28 RX ORDER — POTASSIUM CHLORIDE 20 MEQ
40 PACKET (EA) ORAL ONCE
Qty: 0 | Refills: 0 | Status: COMPLETED | OUTPATIENT
Start: 2018-11-28 | End: 2018-11-28

## 2018-11-28 RX ADMIN — Medication 1000 MILLILITER(S): at 07:17

## 2018-11-28 RX ADMIN — ZOLPIDEM TARTRATE 5 MILLIGRAM(S): 10 TABLET ORAL at 22:14

## 2018-11-28 RX ADMIN — HEPARIN SODIUM 5000 UNIT(S): 5000 INJECTION INTRAVENOUS; SUBCUTANEOUS at 06:01

## 2018-11-28 RX ADMIN — POTASSIUM PHOSPHATE, MONOBASIC POTASSIUM PHOSPHATE, DIBASIC 62.5 MILLIMOLE(S): 236; 224 INJECTION, SOLUTION INTRAVENOUS at 16:36

## 2018-11-28 RX ADMIN — SODIUM CHLORIDE 125 MILLILITER(S): 9 INJECTION, SOLUTION INTRAVENOUS at 08:58

## 2018-11-28 RX ADMIN — ZOLPIDEM TARTRATE 5 MILLIGRAM(S): 10 TABLET ORAL at 21:15

## 2018-11-28 RX ADMIN — Medication 40 MILLIEQUIVALENT(S): at 16:38

## 2018-11-28 RX ADMIN — Medication 100 GRAM(S): at 15:13

## 2018-11-28 RX ADMIN — HEPARIN SODIUM 5000 UNIT(S): 5000 INJECTION INTRAVENOUS; SUBCUTANEOUS at 22:14

## 2018-11-28 RX ADMIN — Medication 6 MILLIGRAM(S): at 22:14

## 2018-11-28 NOTE — PROGRESS NOTE ADULT - SUBJECTIVE AND OBJECTIVE BOX
CC: f/u for fever and abdominal pain    Patient reports: she is tolerating a diet, no abdominal pain, s/p colonoscopy    REVIEW OF SYSTEMS:  All other review of systems negative (Comprehensive ROS)    Antimicrobials Day #  :s/p limited CTX    Other Medications Reviewed    T(F): 97.6 (11-28-18 @ 15:29), Max: 98.3 (11-28-18 @ 08:31)  HR: 68 (11-28-18 @ 15:29)  BP: 161/85 (11-28-18 @ 15:29)  RR: 14 (11-28-18 @ 15:29)  SpO2: 100% (11-28-18 @ 15:29)  Wt(kg): --    PHYSICAL EXAM:  General: alert, no acute distress  Eyes:  anicteric, no conjunctival injection, no discharge  Oropharynx: no lesions or injection 	  Neck: supple, without adenopathy  Lungs: clear to auscultation  Heart: regular rate and rhythm; no murmur, rubs or gallops  Abdomen: soft, nondistended, nontender, without mass or organomegaly  Skin: no lesions  Extremities: no clubbing, cyanosis, or edema  Neurologic: alert, oriented, moves all extremities    LAB RESULTS:                        11.4   8.1   )-----------( 197      ( 28 Nov 2018 07:04 )             33.9     11-28    139  |  105  |  13  ----------------------------<  128<H>  3.7   |  23  |  0.76    Ca    8.5      28 Nov 2018 07:04  Phos  1.9     11-28  Mg     1.7     11-28          MICROBIOLOGY:  RECENT CULTURES:  11-25 @ 09:30 .Urine Clean Catch (Midstream)     No growth      11-25 @ 09:10 .Blood Blood-Peripheral     No growth to date.          RADIOLOGY REVIEWED:  < from: CT Abdomen and Pelvis w/ Oral Cont (11.25.18 @ 11:22) >  IMPRESSION:     Diffusely dilated fluid-filled small and large bowel to the level of the   splenic flexor. No definite evidence of transition zone in the small or   large bowel. Mildly distended descending colon sigmoid colon and rectum   with fluid and stool. Findings may represent a nonspecific ileus versus   ileal colitis versus unclear etiology. A mild stricture of the proximal   descending colon cannot entirely be excluded.    Thickening of the distal esophagus, correlate foresophagitis    Question nodularity along the second portion of the duodenum, nonspecific   in nature, upper endoscopy can be obtained for further evaluation    Colonic diverticulosis without definite diverticulitis.    < end of copied text >

## 2018-11-28 NOTE — PROGRESS NOTE ADULT - ASSESSMENT
78 year old female with PMH HTN, DM2, dyslipidemia presented to ED with abdominal pain and found to have a colonic stricture    #Descending colon stricture  -Colonoscopy today    #Transient A-fib in setting of SIRS  -Resolved  -Rate control  -Cardio follow-up to determine utility of long standing A/C in this particular case of transient A-fib    #DM2  -FS controlled  -ISS prn  -A1C 6.8    #Morbid obesity  -BMI 36 in setting of DM  -Outpatient exercise regimen    #Hypophosphatemia  -Replete    *Patient is NSR on tele monitor > 24 hours. No further A-fib episodes. If remains NSR by tomorrow AM, would d/c tele monitor

## 2018-11-28 NOTE — PROGRESS NOTE ADULT - ASSESSMENT
Mult abdom surgeries, hernia repairs  Recent Tx for UTI  Here with abdom pain, transient diarrhea and fever  Diarrhea and fever resolved, WBC normal, hemodyn stable, Cxs negative, Cdiff negative  No sign of infection at present  CT ileus vs enteritis  Stable off antibiotics  She is s/p colonoscopy, she reports no significant findings  Plan:  monitor off antibiotics  No signs of active infection  Discharge planning per other services

## 2018-11-28 NOTE — PROGRESS NOTE ADULT - ASSESSMENT
Physical Examination:  GENERAL:               Alert, Oriented, No acute distress.    PULM:                     Bilateral air entry,, no significant sputum production, No Rales, No Rhonchi, No Wheezing  CVS:                         S1, S2,  No Murmur  ABD:                        Soft, nondistended, nontender, normoactive bowel sounds, + ventral hernia, reducible   EXT:                         No edema, nontender, No Cyanosis or Clubbing   Vascular:                Warm Extremities, Normal Capillary refill, Normal Distal Pulses  NEURO:                  Alert, oriented, interactive, nonfocal, follows commands  PSYC:                      Calm, + Insight.      Assessment  Severe SIRS - due to Small bowel stricture and severe dehydration  RAJ due to hypovolemia - Resolved  Lactic acidosis combination of dehydration/ hypovolemia and metformin use ; exam not consistent to mesenteric ischemia now resolved  new onset Afib currently on Cardizem oral, currently not anticoagulated.   ? UTI , currently off abx  Underlying HTN, High chol, DM2    Plan  Diet as tolerated  rate control as per Cardio  off abx as per ID  S/P colonoscopy today  insulin ss       continue care on medical floor, ? d/c planning,   Goals of Care: Full code

## 2018-11-28 NOTE — PROGRESS NOTE ADULT - SUBJECTIVE AND OBJECTIVE BOX
Follow-up Pulmonary Progress Note  Chief Complaint : Partial intestinal obstruction      pt feels "infinitely" better  no cp, sob, palp, n/v      Allergies :No Known Allergies      PAST MEDICAL & SURGICAL HISTORY:  Type 2 diabetes mellitus without complication, unspecified whether long term insulin use  Incontinence in female  Hyperlipidemia, unspecified hyperlipidemia type  Hypertension, unspecified type  Other age-related cataract of both eyes  H/O inguinal hernia  H/O umbilical hernia repair      Medications:  MEDICATIONS  (STANDING):  dextrose 5%. 1000 milliLiter(s) (50 mL/Hr) IV Continuous <Continuous>  dextrose 50% Injectable 12.5 Gram(s) IV Push once  dextrose 50% Injectable 25 Gram(s) IV Push once  dextrose 50% Injectable 25 Gram(s) IV Push once  diltiazem    Tablet 30 milliGRAM(s) Oral every 6 hours  heparin  Injectable 5000 Unit(s) SubCutaneous every 8 hours  insulin lispro (HumaLOG) corrective regimen sliding scale   SubCutaneous three times a day before meals  insulin lispro (HumaLOG) corrective regimen sliding scale   SubCutaneous at bedtime  lactated ringers. 1000 milliLiter(s) (60 mL/Hr) IV Continuous <Continuous>  melatonin 6 milliGRAM(s) Oral at bedtime  pantoprazole    Tablet 40 milliGRAM(s) Oral before breakfast  potassium chloride    Tablet ER 40 milliEquivalent(s) Oral once  potassium phosphate IVPB 15 milliMole(s) IV Intermittent once    MEDICATIONS  (PRN):  acetaminophen  Suppository .. 650 milliGRAM(s) Rectal every 6 hours PRN Temp greater or equal to 38C (100.4F)  dextrose 40% Gel 15 Gram(s) Oral once PRN Blood Glucose LESS THAN 70 milliGRAM(s)/deciliter  glucagon  Injectable 1 milliGRAM(s) IntraMuscular once PRN Glucose LESS THAN 70 milligrams/deciliter      LABS:                        11.4   8.1   )-----------( 197      ( 28 Nov 2018 07:04 )             33.9     11-28    139  |  105  |  13  ----------------------------<  128<H>  3.7   |  23  |  0.76    Ca    8.5      28 Nov 2018 07:04  Phos  1.9     11-28  Mg     1.7     11-28                          CULTURES: (if applicable)    Culture - Urine (collected 11-25-18 @ 09:30)  Source: .Urine Clean Catch (Midstream)  Final Report (11-26-18 @ 11:45):    No growth    Culture - Blood (collected 11-25-18 @ 09:10)  Source: .Blood Blood-Peripheral  Preliminary Report (11-26-18 @ 13:01):    No growth to date.    Culture - Blood (collected 11-25-18 @ 09:10)  Source: .Blood Blood-Peripheral  Preliminary Report (11-26-18 @ 13:01):    No growth to date.            CAPILLARY BLOOD GLUCOSE      POCT Blood Glucose.: 108 mg/dL (28 Nov 2018 11:36)      RADIOLOGY  < from: Xray Chest 1 View-PORTABLE IMMEDIATE (11.25.18 @ 09:47) >    FINDINGS/  IMPRESSION:  No gross consolidation or pleural effusion    Heart size cannot be accurately assessed in this projection.    < end of copied text >    VITALS:  T(C): 36.4 (11-28-18 @ 15:29), Max: 36.8 (11-28-18 @ 08:31)  T(F): 97.6 (11-28-18 @ 15:29), Max: 98.3 (11-28-18 @ 08:31)  HR: 68 (11-28-18 @ 15:29) (64 - 68)  BP: 161/85 (11-28-18 @ 15:29) (124/38 - 161/85)  BP(mean): --  ABP: --  ABP(mean): --  RR: 14 (11-28-18 @ 15:29) (14 - 15)  SpO2: 100% (11-28-18 @ 15:29) (97% - 100%)  CVP(mm Hg): --  CVP(cm H2O): --    Ins and Outs     11-27-18 @ 07:01  -  11-28-18 @ 07:00  --------------------------------------------------------  IN: 2060 mL / OUT: 2 mL / NET: 2058 mL    11-28-18 @ 07:01  -  11-28-18 @ 15:57  --------------------------------------------------------  IN: 225 mL / OUT: 0 mL / NET: 225 mL

## 2018-11-28 NOTE — PROGRESS NOTE ADULT - SUBJECTIVE AND OBJECTIVE BOX
Patient is a 78y old  Female who presents with a chief complaint of Abdominal pain (27 Nov 2018 20:38)    Patient seen and examined at bedside.    ALLERGIES:  No Known Allergies    MEDICATIONS  (STANDING):  dextrose 5%. 1000 milliLiter(s) (50 mL/Hr) IV Continuous <Continuous>  dextrose 50% Injectable 12.5 Gram(s) IV Push once  dextrose 50% Injectable 25 Gram(s) IV Push once  dextrose 50% Injectable 25 Gram(s) IV Push once  diltiazem    Tablet 30 milliGRAM(s) Oral every 6 hours  heparin  Injectable 5000 Unit(s) SubCutaneous every 8 hours  insulin lispro (HumaLOG) corrective regimen sliding scale   SubCutaneous three times a day before meals  insulin lispro (HumaLOG) corrective regimen sliding scale   SubCutaneous at bedtime  lactated ringers. 1000 milliLiter(s) (125 mL/Hr) IV Continuous <Continuous>  melatonin 6 milliGRAM(s) Oral at bedtime  pantoprazole    Tablet 40 milliGRAM(s) Oral before breakfast    MEDICATIONS  (PRN):  acetaminophen  Suppository .. 650 milliGRAM(s) Rectal every 6 hours PRN Temp greater or equal to 38C (100.4F)  dextrose 40% Gel 15 Gram(s) Oral once PRN Blood Glucose LESS THAN 70 milliGRAM(s)/deciliter  glucagon  Injectable 1 milliGRAM(s) IntraMuscular once PRN Glucose LESS THAN 70 milligrams/deciliter    Vital Signs Last 24 Hrs  T(F): 97.6 (28 Nov 2018 11:42), Max: 98.3 (28 Nov 2018 08:31)  HR: 64 (28 Nov 2018 11:42) (61 - 68)  BP: 148/78 (28 Nov 2018 11:42) (113/66 - 148/78)  RR: 14 (28 Nov 2018 11:42) (14 - 21)  SpO2: 97% (28 Nov 2018 11:42) (96% - 98%)  I&O's Summary    27 Nov 2018 07:01  -  28 Nov 2018 07:00  --------------------------------------------------------  IN: 2060 mL / OUT: 2 mL / NET: 2058 mL    28 Nov 2018 07:01  -  28 Nov 2018 12:09  --------------------------------------------------------  IN: 225 mL / OUT: 0 mL / NET: 225 mL      PHYSICAL EXAM:  General: NAD, A/O x 3  ENT: MMM  Neck: Supple, No JVD  Lungs: Clear to auscultation bilaterally  Cardio: RRR, S1/S2, No murmurs  Abdomen: Soft, Nontender, Nondistended; Bowel sounds present  Extremities: No calf tenderness, 2+ bilateral pitting edema    LABS:                        11.4   8.1   )-----------( 197      ( 28 Nov 2018 07:04 )             33.9     11-28    139  |  105  |  13  ----------------------------<  128  3.7   |  23  |  0.76    Ca    8.5      28 Nov 2018 07:04  Phos  1.9     11-28  Mg     1.7     11-28      eGFR if Non African American: 74 mL/min/1.73M2 (11-28-18 @ 07:04)  eGFR if African American: 86 mL/min/1.73M2 (11-28-18 @ 07:04)      Lactate, Blood: 1.6 mmol/L (11-25 @ 22:20)  Lactate, Blood: 2.9 mmol/L (11-25 @ 14:40)    CARDIAC MARKERS ( 26 Nov 2018 06:25 )  <.017 ng/mL / x     / x     / x     / x      CARDIAC MARKERS ( 25 Nov 2018 15:00 )  <.017 ng/mL / x     / x     / x     / x            TSH 0.46   TSH with FT4 reflex --  Total T3 40        CAPILLARY BLOOD GLUCOSE      POCT Blood Glucose.: 108 mg/dL (28 Nov 2018 11:36)  POCT Blood Glucose.: 140 mg/dL (28 Nov 2018 07:36)  POCT Blood Glucose.: 126 mg/dL (27 Nov 2018 20:36)  POCT Blood Glucose.: 134 mg/dL (27 Nov 2018 17:01)    11-26 [id number] 6.8        Culture - Urine (collected 25 Nov 2018 09:30)  Source: .Urine Clean Catch (Midstream)  Final Report (26 Nov 2018 11:45):    No growth    Culture - Blood (collected 25 Nov 2018 09:10)  Source: .Blood Blood-Peripheral  Preliminary Report (26 Nov 2018 13:01):    No growth to date.    Culture - Blood (collected 25 Nov 2018 09:10)  Source: .Blood Blood-Peripheral  Preliminary Report (26 Nov 2018 13:01):    No growth to date.      RADIOLOGY & ADDITIONAL TESTS:  Colonoscopy - for today    < from: CT Abdomen and Pelvis w/ Oral Cont (11.25.18 @ 11:22) >  Diffusely dilated fluid-filled small and large bowel to the level of the   splenic flexor. No definite evidence of transition zone in the small or   large bowel. Mildly distended descending colon sigmoid colon and rectum   with fluid and stool. Findings may represent a nonspecific ileus versus   ileal colitis versus unclear etiology. A mild stricture of the proximal   descending colon cannot entirely be excluded.    Thickening of the distal esophagus, correlate foresophagitis    Question nodularity along the second portion of the duodenum, nonspecific   in nature, upper endoscopy can be obtained for further evaluation    Colonic diverticulosis without definite diverticulitis.    < end of copied text >    Care Discussed with Consultants/Other Providers: Dr. Baptiste, Dr. Zavaleta

## 2018-11-29 ENCOUNTER — TRANSCRIPTION ENCOUNTER (OUTPATIENT)
Age: 78
End: 2018-11-29

## 2018-11-29 VITALS
DIASTOLIC BLOOD PRESSURE: 83 MMHG | RESPIRATION RATE: 15 BRPM | SYSTOLIC BLOOD PRESSURE: 142 MMHG | HEART RATE: 73 BPM | OXYGEN SATURATION: 97 % | TEMPERATURE: 98 F

## 2018-11-29 DIAGNOSIS — I48.91 UNSPECIFIED ATRIAL FIBRILLATION: ICD-10-CM

## 2018-11-29 LAB
ANION GAP SERPL CALC-SCNC: 9 MMOL/L — SIGNIFICANT CHANGE UP (ref 5–17)
BUN SERPL-MCNC: 13 MG/DL — SIGNIFICANT CHANGE UP (ref 7–23)
CALCIUM SERPL-MCNC: 8 MG/DL — LOW (ref 8.4–10.5)
CHLORIDE SERPL-SCNC: 107 MMOL/L — SIGNIFICANT CHANGE UP (ref 96–108)
CO2 SERPL-SCNC: 23 MMOL/L — SIGNIFICANT CHANGE UP (ref 22–31)
CREAT SERPL-MCNC: 0.86 MG/DL — SIGNIFICANT CHANGE UP (ref 0.5–1.3)
GLUCOSE SERPL-MCNC: 131 MG/DL — HIGH (ref 70–99)
HCT VFR BLD CALC: 29.7 % — LOW (ref 34.5–45)
HGB BLD-MCNC: 10 G/DL — LOW (ref 11.5–15.5)
MCHC RBC-ENTMCNC: 31.4 PG — SIGNIFICANT CHANGE UP (ref 27–34)
MCHC RBC-ENTMCNC: 33.8 GM/DL — SIGNIFICANT CHANGE UP (ref 32–36)
MCV RBC AUTO: 92.7 FL — SIGNIFICANT CHANGE UP (ref 80–100)
PHOSPHATE SERPL-MCNC: 2.7 MG/DL — SIGNIFICANT CHANGE UP (ref 2.5–4.5)
PLATELET # BLD AUTO: 185 K/UL — SIGNIFICANT CHANGE UP (ref 150–400)
POTASSIUM SERPL-MCNC: 4.1 MMOL/L — SIGNIFICANT CHANGE UP (ref 3.5–5.3)
POTASSIUM SERPL-SCNC: 4.1 MMOL/L — SIGNIFICANT CHANGE UP (ref 3.5–5.3)
RBC # BLD: 3.2 M/UL — LOW (ref 3.8–5.2)
RBC # FLD: 12 % — SIGNIFICANT CHANGE UP (ref 10.3–14.5)
SODIUM SERPL-SCNC: 139 MMOL/L — SIGNIFICANT CHANGE UP (ref 135–145)
WBC # BLD: 8 K/UL — SIGNIFICANT CHANGE UP (ref 3.8–10.5)
WBC # FLD AUTO: 8 K/UL — SIGNIFICANT CHANGE UP (ref 3.8–10.5)

## 2018-11-29 PROCEDURE — 86900 BLOOD TYPING SEROLOGIC ABO: CPT

## 2018-11-29 PROCEDURE — 84436 ASSAY OF TOTAL THYROXINE: CPT

## 2018-11-29 PROCEDURE — 93005 ELECTROCARDIOGRAM TRACING: CPT

## 2018-11-29 PROCEDURE — 99239 HOSP IP/OBS DSCHRG MGMT >30: CPT

## 2018-11-29 PROCEDURE — 80048 BASIC METABOLIC PNL TOTAL CA: CPT

## 2018-11-29 PROCEDURE — 74018 RADEX ABDOMEN 1 VIEW: CPT

## 2018-11-29 PROCEDURE — 85027 COMPLETE CBC AUTOMATED: CPT

## 2018-11-29 PROCEDURE — 80053 COMPREHEN METABOLIC PANEL: CPT

## 2018-11-29 PROCEDURE — 82962 GLUCOSE BLOOD TEST: CPT

## 2018-11-29 PROCEDURE — 81001 URINALYSIS AUTO W/SCOPE: CPT

## 2018-11-29 PROCEDURE — 83036 HEMOGLOBIN GLYCOSYLATED A1C: CPT

## 2018-11-29 PROCEDURE — 99285 EMERGENCY DEPT VISIT HI MDM: CPT | Mod: 25

## 2018-11-29 PROCEDURE — 87040 BLOOD CULTURE FOR BACTERIA: CPT

## 2018-11-29 PROCEDURE — 87086 URINE CULTURE/COLONY COUNT: CPT

## 2018-11-29 PROCEDURE — 71045 X-RAY EXAM CHEST 1 VIEW: CPT

## 2018-11-29 PROCEDURE — 86850 RBC ANTIBODY SCREEN: CPT

## 2018-11-29 PROCEDURE — 87493 C DIFF AMPLIFIED PROBE: CPT

## 2018-11-29 PROCEDURE — 74176 CT ABD & PELVIS W/O CONTRAST: CPT

## 2018-11-29 PROCEDURE — 86901 BLOOD TYPING SEROLOGIC RH(D): CPT

## 2018-11-29 PROCEDURE — 96365 THER/PROPH/DIAG IV INF INIT: CPT | Mod: XU

## 2018-11-29 PROCEDURE — 99232 SBSQ HOSP IP/OBS MODERATE 35: CPT

## 2018-11-29 PROCEDURE — 96375 TX/PRO/DX INJ NEW DRUG ADDON: CPT

## 2018-11-29 PROCEDURE — 85730 THROMBOPLASTIN TIME PARTIAL: CPT

## 2018-11-29 PROCEDURE — 83605 ASSAY OF LACTIC ACID: CPT

## 2018-11-29 PROCEDURE — 96372 THER/PROPH/DIAG INJ SC/IM: CPT | Mod: XU

## 2018-11-29 PROCEDURE — 83735 ASSAY OF MAGNESIUM: CPT

## 2018-11-29 PROCEDURE — 84480 ASSAY TRIIODOTHYRONINE (T3): CPT

## 2018-11-29 PROCEDURE — 84443 ASSAY THYROID STIM HORMONE: CPT

## 2018-11-29 PROCEDURE — 85610 PROTHROMBIN TIME: CPT

## 2018-11-29 PROCEDURE — 83690 ASSAY OF LIPASE: CPT

## 2018-11-29 PROCEDURE — 84484 ASSAY OF TROPONIN QUANT: CPT

## 2018-11-29 PROCEDURE — 84100 ASSAY OF PHOSPHORUS: CPT

## 2018-11-29 PROCEDURE — 93306 TTE W/DOPPLER COMPLETE: CPT

## 2018-11-29 RX ORDER — DILTIAZEM HCL 120 MG
1 CAPSULE, EXT RELEASE 24 HR ORAL
Qty: 30 | Refills: 0
Start: 2018-11-29 | End: 2018-12-28

## 2018-11-29 RX ORDER — DILTIAZEM HCL 120 MG
1 CAPSULE, EXT RELEASE 24 HR ORAL
Qty: 0 | Refills: 0 | COMMUNITY
Start: 2018-11-29

## 2018-11-29 RX ORDER — PANTOPRAZOLE SODIUM 20 MG/1
1 TABLET, DELAYED RELEASE ORAL
Qty: 30 | Refills: 0
Start: 2018-11-29 | End: 2018-12-28

## 2018-11-29 RX ORDER — LANOLIN ALCOHOL/MO/W.PET/CERES
2 CREAM (GRAM) TOPICAL
Qty: 0 | Refills: 0 | COMMUNITY
Start: 2018-11-29

## 2018-11-29 RX ADMIN — Medication 120 MILLIGRAM(S): at 05:57

## 2018-11-29 RX ADMIN — PANTOPRAZOLE SODIUM 40 MILLIGRAM(S): 20 TABLET, DELAYED RELEASE ORAL at 06:24

## 2018-11-29 RX ADMIN — HEPARIN SODIUM 5000 UNIT(S): 5000 INJECTION INTRAVENOUS; SUBCUTANEOUS at 05:58

## 2018-11-29 NOTE — PROGRESS NOTE ADULT - SUBJECTIVE AND OBJECTIVE BOX
Patient is a 78y old  Female who presents with a chief complaint of Abdominal pain  without comnplaints tolerating food well.      Patient seen and examined at bedside.    ALLERGIES:  No Known Allergies    MEDICATIONS  (STANDING):  dextrose 5%. 1000 milliLiter(s) (50 mL/Hr) IV Continuous <Continuous>  dextrose 50% Injectable 12.5 Gram(s) IV Push once  dextrose 50% Injectable 25 Gram(s) IV Push once  dextrose 50% Injectable 25 Gram(s) IV Push once  diltiazem    milliGRAM(s) Oral daily  heparin  Injectable 5000 Unit(s) SubCutaneous every 8 hours  insulin lispro (HumaLOG) corrective regimen sliding scale   SubCutaneous three times a day before meals  insulin lispro (HumaLOG) corrective regimen sliding scale   SubCutaneous at bedtime  melatonin 6 milliGRAM(s) Oral at bedtime  pantoprazole    Tablet 40 milliGRAM(s) Oral before breakfast    MEDICATIONS  (PRN):  acetaminophen  Suppository .. 650 milliGRAM(s) Rectal every 6 hours PRN Temp greater or equal to 38C (100.4F)  dextrose 40% Gel 15 Gram(s) Oral once PRN Blood Glucose LESS THAN 70 milliGRAM(s)/deciliter  glucagon  Injectable 1 milliGRAM(s) IntraMuscular once PRN Glucose LESS THAN 70 milligrams/deciliter  zolpidem 5 milliGRAM(s) Oral at bedtime PRN Insomnia  zolpidem 5 milliGRAM(s) Oral at bedtime PRN Insomnia    Vital Signs Last 24 Hrs  T(F): 97.8 (29 Nov 2018 06:41), Max: 98.4 (28 Nov 2018 20:09)  HR: 64 (29 Nov 2018 06:41) (64 - 73)  BP: 143/69 (29 Nov 2018 06:41) (143/69 - 167/85)  RR: 14 (29 Nov 2018 06:41) (14 - 14)  SpO2: 100% (29 Nov 2018 06:41) (97% - 100%)  I&O's Summary    28 Nov 2018 07:01  -  29 Nov 2018 07:00  --------------------------------------------------------  IN: 225 mL / OUT: 1 mL / NET: 224 mL    29 Nov 2018 07:01  -  29 Nov 2018 09:06  --------------------------------------------------------  IN: 300 mL / OUT: 0 mL / NET: 300 mL      PHYSICAL EXAM:  General: NAD, A/O x 3  ENT: MMM  Neck: Supple, No JVD  Lungs: Clear to auscultation bilaterally  Cardio: RRR, S1/S2, No murmurs  Abdomen: Soft, Nontender, Nondistended; Bowel sounds present  Extremities: No calf tenderness, No pitting edema    LABS:                        10.0   8.0   )-----------( 185      ( 29 Nov 2018 05:00 )             29.7     11-29    139  |  107  |  13  ----------------------------<  131  4.1   |  23  |  0.86    Ca    8.0      29 Nov 2018 05:00  Phos  2.7     11-29  Mg     1.7     11-28      eGFR if Non African American: 64 mL/min/1.73M2 (11-29-18 @ 05:00)  eGFR if : 74 mL/min/1.73M2 (11-29-18 @ 05:00)                    CAPILLARY BLOOD GLUCOSE      POCT Blood Glucose.: 108 mg/dL (28 Nov 2018 11:36)    11-26 [id number] 6.8        Culture - Urine (collected 25 Nov 2018 09:30)  Source: .Urine Clean Catch (Midstream)  Final Report (26 Nov 2018 11:45):    No growth    Culture - Blood (collected 25 Nov 2018 09:10)  Source: .Blood Blood-Peripheral  Preliminary Report (26 Nov 2018 13:01):    No growth to date.    Culture - Blood (collected 25 Nov 2018 09:10)  Source: .Blood Blood-Peripheral  Preliminary Report (26 Nov 2018 13:01):    No growth to date.      RADIOLOGY & ADDITIONAL TESTS:    Care Discussed with Consultants/Other Providers: Patient is a 78y old  Female who presents with a chief complaint of Abdominal pain  without comnplaints tolerating food well.      Patient seen and examined at bedside.    ALLERGIES:  No Known Allergies    MEDICATIONS  (STANDING):  dextrose 5%. 1000 milliLiter(s) (50 mL/Hr) IV Continuous <Continuous>  dextrose 50% Injectable 12.5 Gram(s) IV Push once  dextrose 50% Injectable 25 Gram(s) IV Push once  dextrose 50% Injectable 25 Gram(s) IV Push once  diltiazem    milliGRAM(s) Oral daily  heparin  Injectable 5000 Unit(s) SubCutaneous every 8 hours  insulin lispro (HumaLOG) corrective regimen sliding scale   SubCutaneous three times a day before meals  insulin lispro (HumaLOG) corrective regimen sliding scale   SubCutaneous at bedtime  melatonin 6 milliGRAM(s) Oral at bedtime  pantoprazole    Tablet 40 milliGRAM(s) Oral before breakfast    MEDICATIONS  (PRN):  acetaminophen  Suppository .. 650 milliGRAM(s) Rectal every 6 hours PRN Temp greater or equal to 38C (100.4F)  dextrose 40% Gel 15 Gram(s) Oral once PRN Blood Glucose LESS THAN 70 milliGRAM(s)/deciliter  glucagon  Injectable 1 milliGRAM(s) IntraMuscular once PRN Glucose LESS THAN 70 milligrams/deciliter  zolpidem 5 milliGRAM(s) Oral at bedtime PRN Insomnia  zolpidem 5 milliGRAM(s) Oral at bedtime PRN Insomnia    Vital Signs Last 24 Hrs  T(F): 97.8 (29 Nov 2018 06:41), Max: 98.4 (28 Nov 2018 20:09)  HR: 64 (29 Nov 2018 06:41) (64 - 73)  BP: 143/69 (29 Nov 2018 06:41) (143/69 - 167/85)  RR: 14 (29 Nov 2018 06:41) (14 - 14)  SpO2: 100% (29 Nov 2018 06:41) (97% - 100%)  I&O's Summary    28 Nov 2018 07:01  -  29 Nov 2018 07:00  --------------------------------------------------------  IN: 225 mL / OUT: 1 mL / NET: 224 mL    29 Nov 2018 07:01  -  29 Nov 2018 09:06  --------------------------------------------------------  IN: 300 mL / OUT: 0 mL / NET: 300 mL      PHYSICAL EXAM:  General: NAD, A/O x 3  ENT: MMM  Neck: Supple, No JVD  Lungs: Clear to auscultation bilaterally  Cardio: RRR, S1/S2, No murmurs  Abdomen: Soft, Nontender, Nondistended; Bowel sounds present  Extremities: No calf tenderness, No pitting edema    LABS:                        10.0   8.0   )-----------( 185      ( 29 Nov 2018 05:00 )             29.7     11-29    139  |  107  |  13  ----------------------------<  131  4.1   |  23  |  0.86    Ca    8.0      29 Nov 2018 05:00  Phos  2.7     11-29  Mg     1.7     11-28      eGFR if Non African American: 64 mL/min/1.73M2 (11-29-18 @ 05:00)  eGFR if : 74 mL/min/1.73M2 (11-29-18 @ 05:00)                    CAPILLARY BLOOD GLUCOSE      POCT Blood Glucose.: 108 mg/dL (28 Nov 2018 11:36)    11-26 [id number] 6.8        Culture - Urine (collected 25 Nov 2018 09:30)  Source: .Urine Clean Catch (Midstream)  Final Report (26 Nov 2018 11:45):    No growth    Culture - Blood (collected 25 Nov 2018 09:10)  Source: .Blood Blood-Peripheral  Preliminary Report (26 Nov 2018 13:01):    No growth to date.    Culture - Blood (collected 25 Nov 2018 09:10)  Source: .Blood Blood-Peripheral  Preliminary Report (26 Nov 2018 13:01):    No growth to date.

## 2018-11-29 NOTE — PROGRESS NOTE ADULT - PROVIDER SPECIALTY LIST ADULT
Critical Care
Hospitalist
Hospitalist
Infectious Disease
Infectious Disease
Surgery
Surgery
Critical Care

## 2018-11-29 NOTE — DISCHARGE NOTE ADULT - CARE PLAN
Principal Discharge DX:	Partial intestinal obstruction, unspecified cause  Goal:	resolved  Assessment and plan of treatment:	Continue to follow with GI  Secondary Diagnosis:	Type 2 diabetes mellitus without complication, unspecified whether long term insulin use  Assessment and plan of treatment:	Continue current management  Secondary Diagnosis:	Incontinence in female  Assessment and plan of treatment:	stable continue current mends  Secondary Diagnosis:	Hypertension, unspecified type  Assessment and plan of treatment:	stable Continue lisinopril  Secondary Diagnosis:	Atrial fibrillation with controlled ventricular rate  Assessment and plan of treatment:	Stable continue rate control  Secondary Diagnosis:	Hyperlipidemia, unspecified hyperlipidemia type  Assessment and plan of treatment:	stable continue statin

## 2018-11-29 NOTE — DISCHARGE NOTE ADULT - PLAN OF CARE
resolved Continue to follow with GI Continue current management stable continue current mends stable Continue lisinopril Stable continue rate control stable continue statin

## 2018-11-29 NOTE — DISCHARGE NOTE ADULT - MEDICATION SUMMARY - MEDICATIONS TO TAKE
I will START or STAY ON the medications listed below when I get home from the hospital:    meloxicam 15 mg oral tablet  -- 1 tab(s) by mouth once a day  -- Indication: For Pain management    Advil PM 38 mg-200 mg oral tablet  -- 2 tab(s) by mouth once a day (at bedtime)  -- Indication: For Pain management    lisinopril 10 mg oral tablet  -- 1 tab(s) by mouth once a day  -- Indication: For HTN    dilTIAZem 120 mg/24 hours oral capsule, extended release  -- 1 cap(s) by mouth once a day  -- Indication: For AFIb    metFORMIN 1000 mg oral tablet  -- 1 tab(s) by mouth 2 times a day  -- Indication: For DM    simvastatin 40 mg oral tablet  -- 1 tab(s) by mouth once a day (at bedtime)  -- Indication: For HLD    zolpidem 10 mg oral tablet  -- 1 tab(s) by mouth once a day (at bedtime)  -- Indication: For Sleep aid    melatonin 3 mg oral tablet  -- 2 tab(s) by mouth once a day (at bedtime)  -- Indication: For sleep aid    pantoprazole 40 mg oral delayed release tablet  -- 1 tab(s) by mouth once a day (before a meal)  -- Indication: For GERD    VESIcare 10 mg oral tablet  -- 1 tab(s) by mouth once a day  -- Indication: For Urinary retention

## 2018-11-29 NOTE — DISCHARGE NOTE ADULT - PATIENT PORTAL LINK FT
You can access the [facility] Patient Portal, offered by [facility], by registering with the following website: [email or web address]

## 2018-11-29 NOTE — PROGRESS NOTE ADULT - ASSESSMENT
78 year old female with PMH HTN, DM2, dyslipidemia presented to ED with abdominal pain and found to have a colonic stricture    #Descending colon stricture  -Colonoscopy today    #Transient A-fib in setting of SIRS  -Resolved  -Rate control  -Cardio follow-up to determine utility of long standing A/C in this particular case of transient A-fib    #DM2  -FS controlled  -ISS prn  -A1C 6.8    #Morbid obesity  -BMI 36 in setting of DM  -Outpatient exercise regimen    #Hypophosphatemia  -Replete    *Patient is NSR on tele monitor > 24 hours. No further A-fib episodes. If remains NSR by tomorrow AM, would d/c tele monitor 78 year old female with PMH HTN, DM2, dyslipidemia presented to ED with abdominal pain and found to have a colonic stricture resolved spontanously.  Colonoscopy negative.

## 2018-11-29 NOTE — DISCHARGE NOTE ADULT - SECONDARY DIAGNOSIS.
Type 2 diabetes mellitus without complication, unspecified whether long term insulin use Incontinence in female Hypertension, unspecified type Atrial fibrillation with controlled ventricular rate Hyperlipidemia, unspecified hyperlipidemia type

## 2018-11-29 NOTE — CHART NOTE - NSCHARTNOTEFT_GEN_A_CORE
NUTRITION FOLLOW UP    SOURCE: Patient/ Medical Record    DIET: Consistent Carbohydrate    Patient s/p colonoscopy yesterday, diet advanced. patient reports tolerating diet well, No GI distress noted.(+) BM (11/28)    CURRENT WEIGHT: 229.2/104kg , (+) 1 edema noted (feet)    PERTINENT MEDS:   Pertinent Medications: MEDICATIONS  (STANDING):  dextrose 5%. 1000 milliLiter(s) (50 mL/Hr) IV Continuous <Continuous>  dextrose 50% Injectable 12.5 Gram(s) IV Push once  dextrose 50% Injectable 25 Gram(s) IV Push once  dextrose 50% Injectable 25 Gram(s) IV Push once  diltiazem    milliGRAM(s) Oral daily  heparin  Injectable 5000 Unit(s) SubCutaneous every 8 hours  insulin lispro (HumaLOG) corrective regimen sliding scale   SubCutaneous three times a day before meals  insulin lispro (HumaLOG) corrective regimen sliding scale   SubCutaneous at bedtime  melatonin 6 milliGRAM(s) Oral at bedtime  pantoprazole    Tablet 40 milliGRAM(s) Oral before breakfast    MEDICATIONS  (PRN):  acetaminophen  Suppository .. 650 milliGRAM(s) Rectal every 6 hours PRN Temp greater or equal to 38C (100.4F)  dextrose 40% Gel 15 Gram(s) Oral once PRN Blood Glucose LESS THAN 70 milliGRAM(s)/deciliter  glucagon  Injectable 1 milliGRAM(s) IntraMuscular once PRN Glucose LESS THAN 70 milligrams/deciliter  zolpidem 5 milliGRAM(s) Oral at bedtime PRN Insomnia  zolpidem 5 milliGRAM(s) Oral at bedtime PRN Insomnia      PERTINENT LABS:  Date: 29 Nov 2018 05:00  Hemoglobin 10.0 (L)  Hematocrit 29.7 (L)    Date: 11-29  Sodium 139  Potassium 4.1  Glucose Serum 131<H>  BUN 13  Creatinine 0.86    ACCUCHECK  POCT Blood Glucose.: 108 mg/dL (28 Nov 2018 11:36)  POCT Blood Glucose.: 140 mg/dL (28 Nov 2018 07:36)  POCT Blood Glucose.: 126 mg/dL (27 Nov 2018 20:36)  POCT Blood Glucose.: 134 mg/dL (27 Nov 2018 17:01)      SKIN:  intact    ESTIMATED NEEDS:   [X] no change since previous assessment  [ ] recalculated:     PREVIOUS NUTRITION DIAGNOSIS: addressed    NUTRITION DIAGNOSIS is   [X] resolved, diet advanced        MONITORING AND EVALUATION:   Tolerated diet well, labs noted, will follow clinical course        NUTRITION RECOMMENDATIONS: Continue current diet

## 2018-11-29 NOTE — PROGRESS NOTE ADULT - ATTENDING COMMENTS
I have personally seen and examined patient on the above date.  I discussed the case with TIP Acevedo and I agree with findings and plan as detailed per note above, which I have amended where appropriate.      Patient is s/p colonoscopy for a #Descending colon stricture - no evidence of any acute obstruction or stricture.     #Transient A-fib in setting of SIRS  -Resolved  -Rate control  -Cardio follow-up to determine utility of long standing A/C in this particular case of transient A-fib    #DM2  -FS controlled  -ISS prn  -A1C 6.8    #Morbid obesity  -BMI 36 in setting of DM  -Outpatient exercise regimen    Stable for d/c. I d/w outpatient PMD Dr. Norwood - she will follow-up with patient.    Time spent on d/c 32 min

## 2018-11-29 NOTE — DISCHARGE NOTE ADULT - CARE PROVIDER_API CALL
Raquel Norwood), Family Medicine  [address] [place] [address]  [address] [place] [address]  Phone: [phone]  Fax: [phone]

## 2018-11-29 NOTE — DISCHARGE NOTE ADULT - CARE PROVIDERS DIRECT ADDRESSES
[email or web address]

## 2018-11-30 LAB
CULTURE RESULTS: SIGNIFICANT CHANGE UP
CULTURE RESULTS: SIGNIFICANT CHANGE UP
SPECIMEN SOURCE: SIGNIFICANT CHANGE UP
SPECIMEN SOURCE: SIGNIFICANT CHANGE UP

## 2018-12-03 ENCOUNTER — APPOINTMENT (OUTPATIENT)
Dept: FAMILY MEDICINE | Facility: CLINIC | Age: 78
End: 2018-12-03
Payer: MEDICARE

## 2018-12-03 VITALS
TEMPERATURE: 98 F | HEIGHT: 63 IN | BODY MASS INDEX: 38.98 KG/M2 | OXYGEN SATURATION: 100 % | SYSTOLIC BLOOD PRESSURE: 162 MMHG | DIASTOLIC BLOOD PRESSURE: 78 MMHG | RESPIRATION RATE: 16 BRPM | HEART RATE: 70 BPM | WEIGHT: 220 LBS

## 2018-12-03 DIAGNOSIS — N39.0 URINARY TRACT INFECTION, SITE NOT SPECIFIED: ICD-10-CM

## 2018-12-03 DIAGNOSIS — R53.1 WEAKNESS: ICD-10-CM

## 2018-12-03 PROCEDURE — 99496 TRANSJ CARE MGMT HIGH F2F 7D: CPT

## 2018-12-03 RX ORDER — MELOXICAM 15 MG/1
15 TABLET ORAL DAILY
Qty: 30 | Refills: 0 | Status: COMPLETED | COMMUNITY
Start: 2017-08-11 | End: 2018-12-03

## 2018-12-03 RX ORDER — CEPHALEXIN 500 MG/1
500 CAPSULE ORAL
Qty: 40 | Refills: 0 | Status: COMPLETED | COMMUNITY
Start: 2018-09-13 | End: 2018-12-03

## 2018-12-03 RX ORDER — NITROFURANTOIN MACROCRYSTALS 100 MG/1
100 CAPSULE ORAL
Qty: 14 | Refills: 0 | Status: COMPLETED | COMMUNITY
Start: 2018-11-05 | End: 2018-12-03

## 2018-12-03 NOTE — PHYSICAL EXAM
[No Acute Distress] : no acute distress [No Respiratory Distress] : no respiratory distress  [Clear to Auscultation] : lungs were clear to auscultation bilaterally [No Accessory Muscle Use] : no accessory muscle use [Normal Rate] : normal rate  [Regular Rhythm] : with a regular rhythm [Normal S1, S2] : normal S1 and S2 [No Murmur] : no murmur heard [Soft] : abdomen soft [Non Tender] : non-tender [Non-distended] : non-distended [No Masses] : no abdominal mass palpated [No HSM] : no HSM [Normal Bowel Sounds] : normal bowel sounds [Normal Supraclavicular Nodes] : no supraclavicular lymphadenopathy [Normal Axillary Nodes] : no axillary lymphadenopathy [Normal Anterior Cervical Nodes] : no anterior cervical lymphadenopathy [No Rash] : no rash [Normal Gait] : normal gait [de-identified] : + 3 b/k LE edema  [High Complexity requires an extensive number of possible diagnoses and/or the management options, extensive complexity of the medical data (tests, etc.) to be reviewed, and a high risk of significant complications, morbidity, and/or mortality as well as c] : High Complexity

## 2018-12-03 NOTE — HEALTH RISK ASSESSMENT
[No falls in past year] : Patient reported no falls in the past year [0] : 2) Feeling down, depressed, or hopeless: Not at all (0) [] : No [HII2Kmccb] : 0 [Patient reported PAP Smear was normal] : Patient reported PAP Smear was normal [Patient reported colonoscopy was normal] : Patient reported colonoscopy was normal [HIV test declined] : HIV test declined [MammogramDate] : few years  [PapSmearDate] : 06/18 [BoneDensityDate] : few years  [ColonoscopyDate] : 11/18 [HepatitisCDate] : 08/17

## 2018-12-03 NOTE — HISTORY OF PRESENT ILLNESS
[Post-hospitalization from ___ Hospital] : Post-hospitalization from [unfilled] Hospital [Admitted on: ___] : The patient was admitted on [unfilled] [Discharged on ___] : discharged on [unfilled] [Discharge Summary] : discharge summary [Pertinent Labs] : pertinent labs [Discharge Med List] : discharge medication list [Med Reconciliation] : medication reconciliation has been completed [Patient Contacted By: ____] : and contacted by [unfilled] [FreeTextEntry2] : 78 year old female came with her brother to f/u after Hospitalization  abdominal pain and found to have a colonic stricture and PAF  resolved spontaneously. MARIA T - improved with hydration.  Colonoscopy negative Dr. Carrillo. patient today denies CP,SOB,Abd pain, no SOB,no N,V,C<D. Patient c/o generalized deconditioning  and b/l LE edema which started after the Hospitalization.

## 2018-12-05 NOTE — DISCHARGE NOTE ADULT - FUNCTIONAL SCREEN CURRENT LEVEL: AMBULATION, MLM
Nursing Note by Cesilia Abbott RMA at 11/11/17 09:37 AM     Author:  Cesilia Abbott RMA Service:  (none) Author Type:  Medical Assistant     Filed:  11/11/17 09:39 AM Encounter Date:  11/11/2017 Status:  Signed     :  Cesilia Abbott RMA (Medical Assistant)            Last treatment reviewed with patient.   Side effects ?[LG1.1T] None[LG1.1M]   Side effects include[LG1.1T] N/A[LG1.1M].   Patient is feeling well today ?[LG1.1T] Yes[LG1.1M]  Photosensitive medication list reviewed with the patient. Has the patient started on any photosensitive medication since last treatment ?[LG1.1T] No[LG1.1M]  If yes, what is the medication?[LG1.1T] N/A[LG1.1M]  Treatment decision is[LG1.1T] to increase per protocol[LG1.1M].   Patient informed.   Treatment administered per protocol and patient tolerated the treatment without incident.[LG1.1T]  Electronically Signed by:    ANGI Cain , 11/11/2017[LG1.2T]        Revision History        User Key Date/Time User Provider Type Action    > LG1.2 11/11/17 09:39 AM Cesilia Abbott RMA Medical Assistant Sign     LG1.1 11/11/17 09:37 AM Cesilia Abbott RMA Medical Assistant     M - Manual, T - Template            
0 = independent

## 2018-12-06 ENCOUNTER — FORM ENCOUNTER (OUTPATIENT)
Age: 78
End: 2018-12-06

## 2018-12-07 ENCOUNTER — APPOINTMENT (OUTPATIENT)
Dept: ULTRASOUND IMAGING | Facility: HOSPITAL | Age: 78
End: 2018-12-07
Payer: MEDICARE

## 2018-12-07 ENCOUNTER — OUTPATIENT (OUTPATIENT)
Dept: OUTPATIENT SERVICES | Facility: HOSPITAL | Age: 78
LOS: 1 days | End: 2018-12-07
Payer: MEDICARE

## 2018-12-07 DIAGNOSIS — Z98.890 OTHER SPECIFIED POSTPROCEDURAL STATES: Chronic | ICD-10-CM

## 2018-12-07 DIAGNOSIS — Z87.19 PERSONAL HISTORY OF OTHER DISEASES OF THE DIGESTIVE SYSTEM: Chronic | ICD-10-CM

## 2018-12-07 DIAGNOSIS — H25.89 OTHER AGE-RELATED CATARACT: Chronic | ICD-10-CM

## 2018-12-07 DIAGNOSIS — R60.0 LOCALIZED EDEMA: ICD-10-CM

## 2018-12-07 PROCEDURE — 93970 EXTREMITY STUDY: CPT

## 2018-12-07 PROCEDURE — 93970 EXTREMITY STUDY: CPT | Mod: 26

## 2018-12-09 NOTE — ED ADULT NURSE NOTE - CAS DISCH CONDITION
Telephone Encounter by Liam Finley RN at 01/12/17 04:06 PM     Author:  Liam Finley RN Service:  (none) Author Type:  Registered Nurse     Filed:  01/12/17 04:07 PM Encounter Date:  1/12/2017 Status:  Signed     :  Liam Finley RN (Registered Nurse)            Routed to Dr Dayton Dias for review at office visit of 1-.     Placed on voucher.[BN1.1M]      Revision History        User Key Date/Time User Provider Type Action    > BN1.1 01/12/17 04:07 PM Liam Finley RN Registered Nurse Sign    M - Manual Stable

## 2018-12-10 ENCOUNTER — RX RENEWAL (OUTPATIENT)
Age: 78
End: 2018-12-10

## 2018-12-11 ENCOUNTER — LABORATORY RESULT (OUTPATIENT)
Age: 78
End: 2018-12-11

## 2018-12-11 ENCOUNTER — MEDICATION RENEWAL (OUTPATIENT)
Age: 78
End: 2018-12-11

## 2018-12-13 ENCOUNTER — APPOINTMENT (OUTPATIENT)
Dept: FAMILY MEDICINE | Facility: CLINIC | Age: 78
End: 2018-12-13

## 2018-12-13 ENCOUNTER — OTHER (OUTPATIENT)
Age: 78
End: 2018-12-13

## 2018-12-13 DIAGNOSIS — R89.9 UNSPECIFIED ABNORMAL FINDING IN SPECIMENS FROM OTHER ORGANS, SYSTEMS AND TISSUES: ICD-10-CM

## 2019-01-03 ENCOUNTER — RX RENEWAL (OUTPATIENT)
Age: 79
End: 2019-01-03

## 2019-01-04 ENCOUNTER — RX RENEWAL (OUTPATIENT)
Age: 79
End: 2019-01-04

## 2019-01-13 ENCOUNTER — FORM ENCOUNTER (OUTPATIENT)
Age: 79
End: 2019-01-13

## 2019-01-14 ENCOUNTER — APPOINTMENT (OUTPATIENT)
Dept: RADIOLOGY | Facility: HOSPITAL | Age: 79
End: 2019-01-14
Payer: MEDICARE

## 2019-01-14 ENCOUNTER — OUTPATIENT (OUTPATIENT)
Dept: OUTPATIENT SERVICES | Facility: HOSPITAL | Age: 79
LOS: 1 days | End: 2019-01-14
Payer: MEDICARE

## 2019-01-14 ENCOUNTER — APPOINTMENT (OUTPATIENT)
Dept: MAMMOGRAPHY | Facility: HOSPITAL | Age: 79
End: 2019-01-14
Payer: MEDICARE

## 2019-01-14 DIAGNOSIS — H25.89 OTHER AGE-RELATED CATARACT: Chronic | ICD-10-CM

## 2019-01-14 DIAGNOSIS — Z00.8 ENCOUNTER FOR OTHER GENERAL EXAMINATION: ICD-10-CM

## 2019-01-14 DIAGNOSIS — Z87.19 PERSONAL HISTORY OF OTHER DISEASES OF THE DIGESTIVE SYSTEM: Chronic | ICD-10-CM

## 2019-01-14 DIAGNOSIS — Z98.890 OTHER SPECIFIED POSTPROCEDURAL STATES: Chronic | ICD-10-CM

## 2019-01-14 PROCEDURE — 77080 DXA BONE DENSITY AXIAL: CPT | Mod: 26

## 2019-01-14 PROCEDURE — 77080 DXA BONE DENSITY AXIAL: CPT

## 2019-01-14 PROCEDURE — 77063 BREAST TOMOSYNTHESIS BI: CPT | Mod: 26

## 2019-01-14 PROCEDURE — 77067 SCR MAMMO BI INCL CAD: CPT

## 2019-01-14 PROCEDURE — 77063 BREAST TOMOSYNTHESIS BI: CPT

## 2019-01-14 PROCEDURE — 77067 SCR MAMMO BI INCL CAD: CPT | Mod: 26

## 2019-01-15 LAB
ALBUMIN SERPL ELPH-MCNC: 4.3 G/DL
ALP BLD-CCNC: 54 U/L
ALT SERPL-CCNC: 18 U/L
ANION GAP SERPL CALC-SCNC: 12 MMOL/L
AST SERPL-CCNC: 24 U/L
BILIRUB SERPL-MCNC: 0.2 MG/DL
BUN SERPL-MCNC: 28 MG/DL
CALCIUM SERPL-MCNC: 9.7 MG/DL
CHLORIDE SERPL-SCNC: 108 MMOL/L
CO2 SERPL-SCNC: 20 MMOL/L
CREAT SERPL-MCNC: 1.15 MG/DL
GLUCOSE SERPL-MCNC: 139 MG/DL
POTASSIUM SERPL-SCNC: 4.9 MMOL/L
PROT SERPL-MCNC: 7.9 G/DL
SODIUM SERPL-SCNC: 139 MMOL/L
URATE SERPL-MCNC: 5.8 MG/DL

## 2019-01-28 ENCOUNTER — RX RENEWAL (OUTPATIENT)
Age: 79
End: 2019-01-28

## 2019-01-31 ENCOUNTER — RX RENEWAL (OUTPATIENT)
Age: 79
End: 2019-01-31

## 2019-03-06 ENCOUNTER — APPOINTMENT (OUTPATIENT)
Dept: FAMILY MEDICINE | Facility: CLINIC | Age: 79
End: 2019-03-06
Payer: MEDICARE

## 2019-03-06 VITALS
OXYGEN SATURATION: 99 % | SYSTOLIC BLOOD PRESSURE: 118 MMHG | TEMPERATURE: 97.7 F | HEIGHT: 63 IN | DIASTOLIC BLOOD PRESSURE: 70 MMHG | RESPIRATION RATE: 17 BRPM | HEART RATE: 98 BPM | BODY MASS INDEX: 34.38 KG/M2 | WEIGHT: 194 LBS

## 2019-03-06 DIAGNOSIS — N39.0 URINARY TRACT INFECTION, SITE NOT SPECIFIED: ICD-10-CM

## 2019-03-06 DIAGNOSIS — S81.802A UNSPECIFIED OPEN WOUND, LEFT LOWER LEG, INITIAL ENCOUNTER: ICD-10-CM

## 2019-03-06 DIAGNOSIS — M81.0 AGE-RELATED OSTEOPOROSIS W/OUT CURRENT PATHOLOGICAL FRACTURE: ICD-10-CM

## 2019-03-06 PROCEDURE — 36415 COLL VENOUS BLD VENIPUNCTURE: CPT

## 2019-03-06 PROCEDURE — 99214 OFFICE O/P EST MOD 30 MIN: CPT | Mod: 25

## 2019-03-06 NOTE — HISTORY OF PRESENT ILLNESS
[FreeTextEntry1] : cc: f/u blood pressure, LE edema, diabetes  [de-identified] : Patient came to f/u on her diabetes, blood pressure, weight, LE edema, She is doing great, denies CP,SOB,abd pain, no N,V,C,D. Patient takes Lasix 40 mg QD. Dexa scan - + Osteoporosis d/w the pt at length

## 2019-03-06 NOTE — COUNSELING
[Weight management counseling provided] : Weight management [Healthy eating counseling provided] : healthy eating [Activity counseling provided] : activity [Fall prevention counseling provided] : fall prevention  [Low Fat Diet] : Low fat diet [Decrease Portions] : Decrease food portions [Walking] : Walking [Adequate lighting] : Adequate lighting [No throw rugs] : No throw rugs [Use recommended devices] : Use recommended devices [Good understanding] : Patient has a good understanding of lifestyle changes and the steps needed to achieve self management goals

## 2019-03-06 NOTE — PHYSICAL EXAM
[No Respiratory Distress] : no respiratory distress  [Clear to Auscultation] : lungs were clear to auscultation bilaterally [No Accessory Muscle Use] : no accessory muscle use [Normal Rate] : normal rate  [Regular Rhythm] : with a regular rhythm [Normal S1, S2] : normal S1 and S2 [No Murmur] : no murmur heard [No Varicosities] : no varicosities [No Edema] : there was no peripheral edema [Soft] : abdomen soft [Non Tender] : non-tender [Non-distended] : non-distended [No Masses] : no abdominal mass palpated [No HSM] : no HSM [Normal Bowel Sounds] : normal bowel sounds [Normal Gait] : normal gait [Alert and Oriented x3] : oriented to person, place, and time [de-identified] : obese

## 2019-03-08 ENCOUNTER — OTHER (OUTPATIENT)
Age: 79
End: 2019-03-08

## 2019-03-08 LAB
25(OH)D3 SERPL-MCNC: 29.2 NG/ML
ALBUMIN SERPL ELPH-MCNC: 4.5 G/DL
ALP BLD-CCNC: 60 U/L
ALT SERPL-CCNC: 16 U/L
ANION GAP SERPL CALC-SCNC: 20 MMOL/L
AST SERPL-CCNC: 17 U/L
BASOPHILS # BLD AUTO: 0.05 K/UL
BASOPHILS NFR BLD AUTO: 0.6 %
BILIRUB SERPL-MCNC: 0.2 MG/DL
BUN SERPL-MCNC: 59 MG/DL
CALCIUM SERPL-MCNC: 10 MG/DL
CHLORIDE SERPL-SCNC: 105 MMOL/L
CHOLEST SERPL-MCNC: 125 MG/DL
CHOLEST/HDLC SERPL: 2.3 RATIO
CO2 SERPL-SCNC: 14 MMOL/L
CREAT SERPL-MCNC: 1.72 MG/DL
EOSINOPHIL # BLD AUTO: 0.09 K/UL
EOSINOPHIL NFR BLD AUTO: 1.1 %
FOLATE SERPL-MCNC: 14.1 NG/ML
GLUCOSE SERPL-MCNC: 151 MG/DL
HBA1C MFR BLD HPLC: 6.7 %
HCT VFR BLD CALC: 41.7 %
HDLC SERPL-MCNC: 55 MG/DL
HGB BLD-MCNC: 13.6 G/DL
IMM GRANULOCYTES NFR BLD AUTO: 0.5 %
LDLC SERPL CALC-MCNC: 51 MG/DL
LYMPHOCYTES # BLD AUTO: 2.59 K/UL
LYMPHOCYTES NFR BLD AUTO: 32 %
MAN DIFF?: NORMAL
MCHC RBC-ENTMCNC: 31.1 PG
MCHC RBC-ENTMCNC: 32.6 GM/DL
MCV RBC AUTO: 95.2 FL
MONOCYTES # BLD AUTO: 0.65 K/UL
MONOCYTES NFR BLD AUTO: 8 %
NEUTROPHILS # BLD AUTO: 4.68 K/UL
NEUTROPHILS NFR BLD AUTO: 57.8 %
PLATELET # BLD AUTO: 298 K/UL
POTASSIUM SERPL-SCNC: 5.6 MMOL/L
PROT SERPL-MCNC: 7.6 G/DL
RBC # BLD: 4.38 M/UL
RBC # FLD: 13.4 %
SODIUM SERPL-SCNC: 139 MMOL/L
TRIGL SERPL-MCNC: 93 MG/DL
TSH SERPL-ACNC: 1.4 UIU/ML
URATE SERPL-MCNC: 8.6 MG/DL
VIT B12 SERPL-MCNC: 255 PG/ML
WBC # FLD AUTO: 8.1 K/UL

## 2019-03-12 ENCOUNTER — RX RENEWAL (OUTPATIENT)
Age: 79
End: 2019-03-12

## 2019-03-12 LAB
ANION GAP SERPL CALC-SCNC: 11 MMOL/L
BUN SERPL-MCNC: 35 MG/DL
CALCIUM SERPL-MCNC: 9.4 MG/DL
CHLORIDE SERPL-SCNC: 107 MMOL/L
CO2 SERPL-SCNC: 18 MMOL/L
CREAT SERPL-MCNC: 1.24 MG/DL
GLUCOSE SERPL-MCNC: 145 MG/DL
POTASSIUM SERPL-SCNC: 5.5 MMOL/L
SODIUM SERPL-SCNC: 136 MMOL/L

## 2019-05-29 ENCOUNTER — RX RENEWAL (OUTPATIENT)
Age: 79
End: 2019-05-29

## 2019-06-05 ENCOUNTER — APPOINTMENT (OUTPATIENT)
Dept: FAMILY MEDICINE | Facility: CLINIC | Age: 79
End: 2019-06-05
Payer: MEDICARE

## 2019-06-05 VITALS
RESPIRATION RATE: 18 BRPM | HEIGHT: 63 IN | OXYGEN SATURATION: 100 % | HEART RATE: 70 BPM | BODY MASS INDEX: 34.73 KG/M2 | WEIGHT: 196 LBS | DIASTOLIC BLOOD PRESSURE: 70 MMHG | TEMPERATURE: 97.9 F | SYSTOLIC BLOOD PRESSURE: 130 MMHG

## 2019-06-05 DIAGNOSIS — R60.0 LOCALIZED EDEMA: ICD-10-CM

## 2019-06-05 PROCEDURE — 99214 OFFICE O/P EST MOD 30 MIN: CPT | Mod: 25

## 2019-06-05 PROCEDURE — 36415 COLL VENOUS BLD VENIPUNCTURE: CPT

## 2019-06-05 RX ORDER — FUROSEMIDE 40 MG/1
40 TABLET ORAL EVERY MORNING
Qty: 30 | Refills: 0 | Status: COMPLETED | COMMUNITY
Start: 2018-12-03 | End: 2019-06-05

## 2019-06-06 ENCOUNTER — EMERGENCY (EMERGENCY)
Facility: HOSPITAL | Age: 79
LOS: 1 days | Discharge: HOME | End: 2019-06-06
Attending: EMERGENCY MEDICINE | Admitting: EMERGENCY MEDICINE
Payer: MEDICARE

## 2019-06-06 VITALS
OXYGEN SATURATION: 97 % | RESPIRATION RATE: 18 BRPM | HEIGHT: 64 IN | DIASTOLIC BLOOD PRESSURE: 77 MMHG | SYSTOLIC BLOOD PRESSURE: 115 MMHG | HEART RATE: 86 BPM | WEIGHT: 195.99 LBS | TEMPERATURE: 98 F

## 2019-06-06 VITALS
HEART RATE: 66 BPM | OXYGEN SATURATION: 99 % | SYSTOLIC BLOOD PRESSURE: 131 MMHG | RESPIRATION RATE: 18 BRPM | DIASTOLIC BLOOD PRESSURE: 83 MMHG

## 2019-06-06 DIAGNOSIS — H25.89 OTHER AGE-RELATED CATARACT: Chronic | ICD-10-CM

## 2019-06-06 DIAGNOSIS — Z98.890 OTHER SPECIFIED POSTPROCEDURAL STATES: Chronic | ICD-10-CM

## 2019-06-06 DIAGNOSIS — Z87.19 PERSONAL HISTORY OF OTHER DISEASES OF THE DIGESTIVE SYSTEM: Chronic | ICD-10-CM

## 2019-06-06 DIAGNOSIS — R79.89 OTHER SPECIFIED ABNORMAL FINDINGS OF BLOOD CHEMISTRY: ICD-10-CM

## 2019-06-06 PROBLEM — R60.0 BILATERAL LOWER EXTREMITY EDEMA: Status: ACTIVE | Noted: 2018-12-03

## 2019-06-06 LAB
ALBUMIN SERPL ELPH-MCNC: 3.5 G/DL — SIGNIFICANT CHANGE UP (ref 3.3–5)
ALBUMIN SERPL ELPH-MCNC: 4.6 G/DL
ALP BLD-CCNC: 46 U/L
ALP SERPL-CCNC: 41 U/L — SIGNIFICANT CHANGE UP (ref 40–120)
ALT FLD-CCNC: 23 U/L DA — SIGNIFICANT CHANGE UP (ref 10–45)
ALT SERPL-CCNC: 15 U/L
ANION GAP SERPL CALC-SCNC: 12 MMOL/L — SIGNIFICANT CHANGE UP (ref 5–17)
ANION GAP SERPL CALC-SCNC: 13 MMOL/L
APPEARANCE: CLEAR
AST SERPL-CCNC: 18 U/L
AST SERPL-CCNC: 21 U/L — SIGNIFICANT CHANGE UP (ref 10–40)
BASOPHILS # BLD AUTO: 0.05 K/UL
BASOPHILS # BLD AUTO: 0.05 K/UL — SIGNIFICANT CHANGE UP (ref 0–0.2)
BASOPHILS NFR BLD AUTO: 0.6 %
BASOPHILS NFR BLD AUTO: 0.7 % — SIGNIFICANT CHANGE UP (ref 0–2)
BILIRUB SERPL-MCNC: 0.2 MG/DL
BILIRUB SERPL-MCNC: 0.3 MG/DL — SIGNIFICANT CHANGE UP (ref 0.2–1.2)
BILIRUBIN URINE: NEGATIVE
BLOOD URINE: NORMAL
BUN SERPL-MCNC: 36 MG/DL
BUN SERPL-MCNC: 46 MG/DL — HIGH (ref 7–23)
CALCIUM SERPL-MCNC: 10.2 MG/DL
CALCIUM SERPL-MCNC: 9.1 MG/DL — SIGNIFICANT CHANGE UP (ref 8.4–10.5)
CHLORIDE SERPL-SCNC: 105 MMOL/L — SIGNIFICANT CHANGE UP (ref 96–108)
CHLORIDE SERPL-SCNC: 106 MMOL/L
CHOLEST SERPL-MCNC: 132 MG/DL
CHOLEST/HDLC SERPL: 2.1 RATIO
CO2 SERPL-SCNC: 16 MMOL/L — LOW (ref 22–31)
CO2 SERPL-SCNC: 17 MMOL/L
COLOR: NORMAL
CREAT SERPL-MCNC: 1.22 MG/DL
CREAT SERPL-MCNC: 1.52 MG/DL — HIGH (ref 0.5–1.3)
CREAT SPEC-SCNC: 81 MG/DL
EOSINOPHIL # BLD AUTO: 0.1 K/UL
EOSINOPHIL # BLD AUTO: 0.11 K/UL — SIGNIFICANT CHANGE UP (ref 0–0.5)
EOSINOPHIL NFR BLD AUTO: 1.2 %
EOSINOPHIL NFR BLD AUTO: 1.5 % — SIGNIFICANT CHANGE UP (ref 0–6)
ESTIMATED AVERAGE GLUCOSE: 140 MG/DL
FOLATE SERPL-MCNC: 13.1 NG/ML
GLUCOSE QUALITATIVE U: NEGATIVE
GLUCOSE SERPL-MCNC: 131 MG/DL
GLUCOSE SERPL-MCNC: 207 MG/DL — HIGH (ref 70–99)
HBA1C MFR BLD HPLC: 6.5 %
HCT VFR BLD CALC: 37.4 % — SIGNIFICANT CHANGE UP (ref 34.5–45)
HCT VFR BLD CALC: 42.9 %
HDLC SERPL-MCNC: 62 MG/DL
HGB BLD-MCNC: 12.5 G/DL — SIGNIFICANT CHANGE UP (ref 11.5–15.5)
HGB BLD-MCNC: 13.5 G/DL
IMM GRANULOCYTES NFR BLD AUTO: 0.5 % — SIGNIFICANT CHANGE UP (ref 0–1.5)
IMM GRANULOCYTES NFR BLD AUTO: 0.6 %
KETONES URINE: NEGATIVE
LDLC SERPL CALC-MCNC: 47 MG/DL
LEUKOCYTE ESTERASE URINE: NEGATIVE
LYMPHOCYTES # BLD AUTO: 2.42 K/UL — SIGNIFICANT CHANGE UP (ref 1–3.3)
LYMPHOCYTES # BLD AUTO: 2.73 K/UL
LYMPHOCYTES # BLD AUTO: 32.4 % — SIGNIFICANT CHANGE UP (ref 13–44)
LYMPHOCYTES NFR BLD AUTO: 31.5 %
MAGNESIUM SERPL-MCNC: 1.2 MG/DL — LOW (ref 1.6–2.6)
MAN DIFF?: NORMAL
MCHC RBC-ENTMCNC: 31.3 PG
MCHC RBC-ENTMCNC: 31.3 PG — SIGNIFICANT CHANGE UP (ref 27–34)
MCHC RBC-ENTMCNC: 31.5 GM/DL
MCHC RBC-ENTMCNC: 33.4 GM/DL — SIGNIFICANT CHANGE UP (ref 32–36)
MCV RBC AUTO: 93.7 FL — SIGNIFICANT CHANGE UP (ref 80–100)
MCV RBC AUTO: 99.3 FL
MICROALBUMIN 24H UR DL<=1MG/L-MCNC: <1.2 MG/DL
MICROALBUMIN/CREAT 24H UR-RTO: NORMAL MG/G
MONOCYTES # BLD AUTO: 0.55 K/UL — SIGNIFICANT CHANGE UP (ref 0–0.9)
MONOCYTES # BLD AUTO: 0.56 K/UL
MONOCYTES NFR BLD AUTO: 6.5 %
MONOCYTES NFR BLD AUTO: 7.4 % — SIGNIFICANT CHANGE UP (ref 2–14)
NEUTROPHILS # BLD AUTO: 4.3 K/UL — SIGNIFICANT CHANGE UP (ref 1.8–7.4)
NEUTROPHILS # BLD AUTO: 5.18 K/UL
NEUTROPHILS NFR BLD AUTO: 57.5 % — SIGNIFICANT CHANGE UP (ref 43–77)
NEUTROPHILS NFR BLD AUTO: 59.6 %
NITRITE URINE: NEGATIVE
NRBC # BLD: 0 /100 WBCS — SIGNIFICANT CHANGE UP (ref 0–0)
PH URINE: 5
PLATELET # BLD AUTO: 247 K/UL — SIGNIFICANT CHANGE UP (ref 150–400)
PLATELET # BLD AUTO: 304 K/UL
POTASSIUM SERPL-MCNC: 5 MMOL/L — SIGNIFICANT CHANGE UP (ref 3.5–5.3)
POTASSIUM SERPL-SCNC: 5 MMOL/L — SIGNIFICANT CHANGE UP (ref 3.5–5.3)
POTASSIUM SERPL-SCNC: 6.5 MMOL/L
PROT SERPL-MCNC: 7.4 G/DL
PROT SERPL-MCNC: 7.4 G/DL — SIGNIFICANT CHANGE UP (ref 6–8.3)
PROTEIN URINE: NEGATIVE
RBC # BLD: 3.99 M/UL — SIGNIFICANT CHANGE UP (ref 3.8–5.2)
RBC # BLD: 4.32 M/UL
RBC # FLD: 12.6 % — SIGNIFICANT CHANGE UP (ref 10.3–14.5)
RBC # FLD: 13 %
SODIUM SERPL-SCNC: 133 MMOL/L — LOW (ref 135–145)
SODIUM SERPL-SCNC: 136 MMOL/L
SPECIFIC GRAVITY URINE: 1.02
TRIGL SERPL-MCNC: 116 MG/DL
TSH SERPL-ACNC: 1.2 UIU/ML
URATE SERPL-MCNC: 4.9 MG/DL
UROBILINOGEN URINE: NORMAL
VIT B12 SERPL-MCNC: 215 PG/ML
WBC # BLD: 7.47 K/UL — SIGNIFICANT CHANGE UP (ref 3.8–10.5)
WBC # FLD AUTO: 7.47 K/UL — SIGNIFICANT CHANGE UP (ref 3.8–10.5)
WBC # FLD AUTO: 8.67 K/UL

## 2019-06-06 PROCEDURE — 99284 EMERGENCY DEPT VISIT MOD MDM: CPT

## 2019-06-06 PROCEDURE — 83735 ASSAY OF MAGNESIUM: CPT

## 2019-06-06 PROCEDURE — 36415 COLL VENOUS BLD VENIPUNCTURE: CPT

## 2019-06-06 PROCEDURE — 80053 COMPREHEN METABOLIC PANEL: CPT

## 2019-06-06 PROCEDURE — 93010 ELECTROCARDIOGRAM REPORT: CPT

## 2019-06-06 PROCEDURE — 99283 EMERGENCY DEPT VISIT LOW MDM: CPT

## 2019-06-06 PROCEDURE — 85027 COMPLETE CBC AUTOMATED: CPT

## 2019-06-06 PROCEDURE — 93005 ELECTROCARDIOGRAM TRACING: CPT

## 2019-06-06 RX ORDER — SIMVASTATIN 20 MG/1
1 TABLET, FILM COATED ORAL
Qty: 0 | Refills: 0 | DISCHARGE

## 2019-06-06 RX ORDER — MELOXICAM 15 MG/1
1 TABLET ORAL
Qty: 0 | Refills: 0 | DISCHARGE

## 2019-06-06 RX ORDER — METFORMIN HYDROCHLORIDE 850 MG/1
1 TABLET ORAL
Qty: 0 | Refills: 0 | DISCHARGE

## 2019-06-06 RX ORDER — ASCORBIC ACID 60 MG
1 TABLET,CHEWABLE ORAL
Qty: 0 | Refills: 0 | DISCHARGE

## 2019-06-06 RX ORDER — ZOLPIDEM TARTRATE 10 MG/1
1 TABLET ORAL
Qty: 0 | Refills: 0 | DISCHARGE

## 2019-06-06 RX ORDER — MAGNESIUM OXIDE 400 MG ORAL TABLET 241.3 MG
400 TABLET ORAL ONCE
Refills: 0 | Status: COMPLETED | OUTPATIENT
Start: 2019-06-06 | End: 2019-06-06

## 2019-06-06 RX ORDER — MULTIVIT-MIN/FERROUS GLUCONATE 9 MG/15 ML
1 LIQUID (ML) ORAL
Qty: 0 | Refills: 0 | DISCHARGE

## 2019-06-06 RX ORDER — CHOLECALCIFEROL (VITAMIN D3) 125 MCG
0 CAPSULE ORAL
Qty: 0 | Refills: 0 | DISCHARGE

## 2019-06-06 RX ORDER — ALENDRONATE SODIUM 70 MG/1
1 TABLET ORAL
Qty: 0 | Refills: 0 | DISCHARGE

## 2019-06-06 RX ORDER — LISINOPRIL 2.5 MG/1
1 TABLET ORAL
Qty: 0 | Refills: 0 | DISCHARGE

## 2019-06-06 RX ORDER — SOLIFENACIN SUCCINATE 10 MG/1
1 TABLET ORAL
Qty: 0 | Refills: 0 | DISCHARGE

## 2019-06-06 RX ADMIN — MAGNESIUM OXIDE 400 MG ORAL TABLET 400 MILLIGRAM(S): 241.3 TABLET ORAL at 10:06

## 2019-06-06 NOTE — ED PROVIDER NOTE - CLINICAL SUMMARY MEDICAL DECISION MAKING FREE TEXT BOX
Patient sent in by PCP for elevated K+. Pt had lab drawn yesterday. Resulted at 6.5, checked twice.   Pt has no complaints. No chest pain, abd pain, nausea vomiting, constipation or diarrhea.   She states that the last time she had her lab done was march and at that time, it was slightly elevated at 5.5. Nothing was to be done.   No changes to diet or medications recently.   Exam normal. Soft abdomen, s1 s2, no gallops or rubs. Well appearing. Plan for EKG and repeat lab with mag level. Patient sent in by PCP for elevated K+. Pt had lab drawn yesterday. Resulted at 6.5, checked twice.   Pt has no complaints. No chest pain, abd pain, nausea vomiting, constipation or diarrhea.   She states that the last time she had her lab done was march and at that time, it was slightly elevated at 5.5. Nothing was to be done.   No changes to diet or medications recently.   Exam normal. Soft abdomen, s1 s2, no gallops or rubs. Well appearing. Plan for EKG and repeat lab with mag level.  Mag 1.2; K 5.0. Mag Ox given PO 400mg x 1 in ED. Advised pt of Creatinine. She will be meeting with Dr Salvador within the next 5 days to discuss as her creatinine has risen before. Consider her lisinopril. Pt stable for dc.

## 2019-06-06 NOTE — ED PROVIDER NOTE - NSFOLLOWUPINSTRUCTIONS_ED_ALL_ED_FT
Worsening, continued or ANY new concerning symptoms return to the emergency department. Follow up with your PMD within 5 days. Show copies of your reports given to you. Take all of your medications as previously prescribed.

## 2019-06-06 NOTE — HISTORY OF PRESENT ILLNESS
[FreeTextEntry1] : cc: f/u blood pressure, K level ,renal function diabetes , LE edema  [de-identified] : Patient came to f/u on her diabetes, blood pressure. Takes her meds daily. She never repeated BMP after last blood work re: elevated K and Cr. Off Lasix - LE edema resolved.  She is doing great, denies CP,SOB,abd pain, no N,V,C,D. Pt c/o trigger finger on and off getting worse.

## 2019-06-06 NOTE — PHYSICAL EXAM
[No Respiratory Distress] : no respiratory distress  [Clear to Auscultation] : lungs were clear to auscultation bilaterally [No Accessory Muscle Use] : no accessory muscle use [Normal Rate] : normal rate  [Regular Rhythm] : with a regular rhythm [Normal S1, S2] : normal S1 and S2 [No Murmur] : no murmur heard [No Varicosities] : no varicosities [No Edema] : there was no peripheral edema [Soft] : abdomen soft [Non Tender] : non-tender [Non-distended] : non-distended [No Masses] : no abdominal mass palpated [No HSM] : no HSM [Normal Bowel Sounds] : normal bowel sounds [No Joint Swelling] : no joint swelling [Grossly Normal Strength/Tone] : grossly normal strength/tone [Normal Gait] : normal gait [Alert and Oriented x3] : oriented to person, place, and time [de-identified] : obese

## 2019-06-06 NOTE — ED ADULT NURSE NOTE - OBJECTIVE STATEMENT
Pt was a PMD yesterday for evaluation, labs were drawn and she was notifies this am of elevated potassium. Pt without symptoms or complaints

## 2019-06-06 NOTE — ED PROVIDER NOTE - CARE PROVIDER_API CALL
Raquel Norwood)  Family Medicine  [address] [place] [address], [address] [place] [address]  Phone: [phone]  Fax: [phone]  Follow Up Time:

## 2019-06-06 NOTE — ED PROVIDER NOTE - OBJECTIVE STATEMENT
Patient sent in by PCP for elevated K+. Pt had lab drawn yesterday. Resulted at 6.5, checked twice.   Pt has no complaints. No chest pain, abd pain, nausea vomiting, constipation or diarrhea.   She states that the last time she had her lab done was march and at that time, it was slightly elevated at 5.5. Nothing was to be done.   No changes to diet or medications recently.

## 2019-06-06 NOTE — COUNSELING
[Weight management counseling provided] : Weight management [Healthy eating counseling provided] : healthy eating [Activity counseling provided] : activity [Fall prevention counseling provided] : fall prevention  [Needs reinforcement, provided] : Patient needs reinforcement on understanding of disease, goals and obesity follow-up plan; reinforcement was provided [Low Fat Diet] : Low fat diet [Decrease Portions] : Decrease food portions [Adequate lighting] : Adequate lighting [No throw rugs] : No throw rugs [Good understanding] : Patient has a good understanding of lifestyle changes and the steps needed to achieve self management goals [Use recommended devices] : Use recommended devices

## 2019-06-10 ENCOUNTER — APPOINTMENT (OUTPATIENT)
Dept: FAMILY MEDICINE | Facility: CLINIC | Age: 79
End: 2019-06-10
Payer: MEDICARE

## 2019-06-10 VITALS
HEART RATE: 74 BPM | OXYGEN SATURATION: 99 % | SYSTOLIC BLOOD PRESSURE: 136 MMHG | DIASTOLIC BLOOD PRESSURE: 78 MMHG | RESPIRATION RATE: 16 BRPM

## 2019-06-10 LAB — 25(OH)D3 SERPL-MCNC: 23.1 NG/ML

## 2019-06-10 PROCEDURE — 96372 THER/PROPH/DIAG INJ SC/IM: CPT

## 2019-06-10 PROCEDURE — 99214 OFFICE O/P EST MOD 30 MIN: CPT | Mod: 25

## 2019-06-10 PROCEDURE — 36415 COLL VENOUS BLD VENIPUNCTURE: CPT

## 2019-06-10 RX ORDER — LISINOPRIL 10 MG/1
10 TABLET ORAL
Qty: 90 | Refills: 3 | Status: DISCONTINUED | COMMUNITY
Start: 2018-05-18 | End: 2019-06-10

## 2019-06-10 RX ORDER — CYANOCOBALAMIN 1000 UG/ML
1000 INJECTION INTRAMUSCULAR; SUBCUTANEOUS
Qty: 0 | Refills: 0 | Status: COMPLETED | OUTPATIENT
Start: 2019-06-10

## 2019-06-10 RX ADMIN — CYANOCOBALAMIN 0 MCG/ML: 1000 INJECTION INTRAMUSCULAR; SUBCUTANEOUS at 00:00

## 2019-06-10 NOTE — PHYSICAL EXAM
[No Respiratory Distress] : no respiratory distress  [Clear to Auscultation] : lungs were clear to auscultation bilaterally [No Accessory Muscle Use] : no accessory muscle use [Normal Rate] : normal rate  [Regular Rhythm] : with a regular rhythm [Normal S1, S2] : normal S1 and S2 [No Murmur] : no murmur heard [No Varicosities] : no varicosities [No Edema] : there was no peripheral edema [Soft] : abdomen soft [Non Tender] : non-tender [Non-distended] : non-distended [No Masses] : no abdominal mass palpated [No HSM] : no HSM [Normal Bowel Sounds] : normal bowel sounds [No Joint Swelling] : no joint swelling [Grossly Normal Strength/Tone] : grossly normal strength/tone [Normal Gait] : normal gait [Alert and Oriented x3] : oriented to person, place, and time [de-identified] : obese

## 2019-06-10 NOTE — HISTORY OF PRESENT ILLNESS
[FreeTextEntry1] : cc; f/u elevated K, RF and low Mg [de-identified] : Patient came to f/u on her elevated K - was 6.5 - in ED was WNL, her renal function was elevated, Mg was low. She denies Cp,SOB,abd pain, no N,V,C. Patient is off ACE, BP is OK. pt Vit B 12 is low.

## 2019-06-11 LAB
ANION GAP SERPL CALC-SCNC: 16 MMOL/L
BUN SERPL-MCNC: 34 MG/DL
CALCIUM SERPL-MCNC: 9.8 MG/DL
CHLORIDE SERPL-SCNC: 105 MMOL/L
CO2 SERPL-SCNC: 15 MMOL/L
CREAT SERPL-MCNC: 1.31 MG/DL
GLUCOSE SERPL-MCNC: 234 MG/DL
MAGNESIUM SERPL-MCNC: 1.6 MG/DL
PHOSPHATE SERPL-MCNC: 3.2 MG/DL
POTASSIUM SERPL-SCNC: 5.8 MMOL/L
SODIUM SERPL-SCNC: 136 MMOL/L

## 2019-06-17 ENCOUNTER — APPOINTMENT (OUTPATIENT)
Dept: FAMILY MEDICINE | Facility: CLINIC | Age: 79
End: 2019-06-17
Payer: MEDICARE

## 2019-06-17 VITALS
SYSTOLIC BLOOD PRESSURE: 152 MMHG | OXYGEN SATURATION: 99 % | HEART RATE: 85 BPM | HEIGHT: 63 IN | DIASTOLIC BLOOD PRESSURE: 82 MMHG | WEIGHT: 196 LBS | BODY MASS INDEX: 34.73 KG/M2 | TEMPERATURE: 98.6 F

## 2019-06-17 DIAGNOSIS — R39.15 URGENCY OF URINATION: ICD-10-CM

## 2019-06-17 PROCEDURE — 99214 OFFICE O/P EST MOD 30 MIN: CPT | Mod: 25

## 2019-06-17 PROCEDURE — 36415 COLL VENOUS BLD VENIPUNCTURE: CPT

## 2019-06-17 NOTE — HISTORY OF PRESENT ILLNESS
[FreeTextEntry1] : cc: f/u blood pressure, K level, needs Vit B 12, Renal insufficiency\par  [de-identified] : Patient came to f/u on her elevated K - was 5.5 - she is not taking any supplements, avoids K rich food, She stopped Lisinopril, her BP is elevated today , she was not taking Diltiazem. Last BW d/w the  pt at length  Mg level WNL, K elevated but lower, CR elevated  - increased hydration was recommended. She denies CP, SOB,abd pain, no N,V,C. Vit B 12 IM was given is low. Patient c/o urinary urgency, was on VESIcare in the past with improvement

## 2019-06-17 NOTE — PHYSICAL EXAM
[No Respiratory Distress] : no respiratory distress  [Clear to Auscultation] : lungs were clear to auscultation bilaterally [No Accessory Muscle Use] : no accessory muscle use [Normal Rate] : normal rate  [Regular Rhythm] : with a regular rhythm [Normal S1, S2] : normal S1 and S2 [No Murmur] : no murmur heard [No Varicosities] : no varicosities [No Edema] : there was no peripheral edema [Soft] : abdomen soft [Non-distended] : non-distended [Non Tender] : non-tender [No Masses] : no abdominal mass palpated [No HSM] : no HSM [Normal Bowel Sounds] : normal bowel sounds [No Joint Swelling] : no joint swelling [Normal Gait] : normal gait [Grossly Normal Strength/Tone] : grossly normal strength/tone [Alert and Oriented x3] : oriented to person, place, and time [de-identified] : obese

## 2019-06-17 NOTE — COUNSELING
[Activity counseling provided] : activity [Low Fat Diet] : Low fat diet [Low Salt Diet] : Low salt diet [Decrease Portions] : Decrease food portions [Walking] : Walking [Good understanding] : Patient has a good understanding of lifestyle changes and the steps needed to achieve self management goals

## 2019-06-18 ENCOUNTER — RX RENEWAL (OUTPATIENT)
Age: 79
End: 2019-06-18

## 2019-06-18 LAB
ANION GAP SERPL CALC-SCNC: 17 MMOL/L
BUN SERPL-MCNC: 45 MG/DL
CALCIUM SERPL-MCNC: 10.1 MG/DL
CHLORIDE SERPL-SCNC: 107 MMOL/L
CO2 SERPL-SCNC: 15 MMOL/L
CREAT SERPL-MCNC: 1.32 MG/DL
GLUCOSE SERPL-MCNC: 131 MG/DL
POTASSIUM SERPL-SCNC: 5.6 MMOL/L
SODIUM SERPL-SCNC: 139 MMOL/L

## 2019-06-18 RX ORDER — SOLIFENACIN SUCCINATE 10 MG/1
10 TABLET ORAL
Qty: 90 | Refills: 0 | Status: DISCONTINUED | COMMUNITY
Start: 2019-06-17 | End: 2019-06-18

## 2019-06-24 ENCOUNTER — APPOINTMENT (OUTPATIENT)
Dept: FAMILY MEDICINE | Facility: CLINIC | Age: 79
End: 2019-06-24
Payer: MEDICARE

## 2019-06-24 VITALS
DIASTOLIC BLOOD PRESSURE: 70 MMHG | TEMPERATURE: 98.4 F | SYSTOLIC BLOOD PRESSURE: 152 MMHG | HEART RATE: 64 BPM | BODY MASS INDEX: 34.73 KG/M2 | HEIGHT: 63 IN | OXYGEN SATURATION: 98 % | WEIGHT: 196 LBS

## 2019-06-24 LAB
ANION GAP SERPL CALC-SCNC: 12 MMOL/L
BUN SERPL-MCNC: 34 MG/DL
CALCIUM SERPL-MCNC: 9.6 MG/DL
CHLORIDE SERPL-SCNC: 105 MMOL/L
CO2 SERPL-SCNC: 20 MMOL/L
CREAT SERPL-MCNC: 1.15 MG/DL
GLUCOSE SERPL-MCNC: 141 MG/DL
POTASSIUM SERPL-SCNC: 5.3 MMOL/L
SODIUM SERPL-SCNC: 137 MMOL/L

## 2019-06-24 PROCEDURE — 36415 COLL VENOUS BLD VENIPUNCTURE: CPT

## 2019-06-24 PROCEDURE — 99214 OFFICE O/P EST MOD 30 MIN: CPT | Mod: 25

## 2019-06-24 PROCEDURE — 96372 THER/PROPH/DIAG INJ SC/IM: CPT

## 2019-06-24 RX ORDER — CYANOCOBALAMIN 1000 UG/ML
1000 INJECTION INTRAMUSCULAR; SUBCUTANEOUS
Qty: 0 | Refills: 0 | Status: COMPLETED | OUTPATIENT
Start: 2019-06-24

## 2019-06-24 RX ADMIN — CYANOCOBALAMIN 0 MCG/ML: 1000 INJECTION, SOLUTION INTRAMUSCULAR at 00:00

## 2019-07-05 ENCOUNTER — APPOINTMENT (OUTPATIENT)
Dept: ORTHOPEDIC SURGERY | Facility: CLINIC | Age: 79
End: 2019-07-05
Payer: MEDICARE

## 2019-07-05 VITALS — BODY MASS INDEX: 34.73 KG/M2 | HEIGHT: 63 IN | WEIGHT: 196 LBS

## 2019-07-05 DIAGNOSIS — M19.039 PRIMARY OSTEOARTHRITIS, UNSPECIFIED WRIST: ICD-10-CM

## 2019-07-05 DIAGNOSIS — M72.0 PALMAR FASCIAL FIBROMATOSIS [DUPUYTREN]: ICD-10-CM

## 2019-07-05 DIAGNOSIS — M65.342 TRIGGER FINGER, LEFT RING FINGER: ICD-10-CM

## 2019-07-05 PROCEDURE — 73130 X-RAY EXAM OF HAND: CPT | Mod: LT

## 2019-07-05 PROCEDURE — 99214 OFFICE O/P EST MOD 30 MIN: CPT

## 2019-07-05 NOTE — ADDENDUM
[FreeTextEntry1] : I, Melvin Fajardo, acted solely as a scribe for Dr. Olivier Wilson on this date 07/05/2019 \par All medical record entries made by the Scribe were at my, Dr. Olivier Wilson, direction and personally dictated by me on 07/05/2019 . I have reviewed the chart and agree that the record accurately reflects my personal performance of the history, physical exam, assessment and plan. I have also personally directed, reviewed, and agreed with the chart.

## 2019-07-05 NOTE — PHYSICAL EXAM
[de-identified] : Constitutional\par o Appearance : well-nourished, well developed, alert, in no acute distress \par Head and Face\par o Head :\par ¦ Inspection : atraumatic, normocephalic\par o Face :\par ¦ Inspection : no visible rash or discoloration\par Respiratory\par o Respiratory Effort: breathing unlabored \par Neurologic\par o Sensation : Normal sensation \par Psychiatric\par o Mood and Affect: mood normal, affect appropriate \par Lymphatic\par o Additional Nodes : No palpable lymph nodes present \par \par Right Upper Extremity\par O Right Wrist:\par ¦ Inspection/Palpation : no tenderness, swelling or deformities\par ¦ Range of Motion : full and painless in all planes, no crepitance\par ¦ Strength : extension, flexion, ulnar deviation and radial deviation 5/5\par ¦ Stability : no joint instability on provocative testing\par ¦ Tests/Signs : Tinel's sign negative over carpal tunnel , negative Phalen’s and Finkelstein’s test\par \par O Right Hand :\par ¦ Inspection/Palpation : no tenderness to palpation or pain with axial compression, hypertrophic changes over basal joint\par ¦ Range of Motion : full arc of motion in the small joints of the hand, no discomfort elicited\par ¦ Strength : all intrinsic and extrinsic hand muscles 5/5\par ¦ Stability : no joint instability on provocative testing\par o Sensation : sensation intact to light touch\par o Skin : no skin lesions or discoloration \par \par Left Upper Extremity \par o Left Wrist:\par ¦ Inspection/Palpation : basal joint tenderness,  no swelling ,hypertrophic  of the basal joint\par ¦ Range of Motion : full and painless in all planes, no crepitance\par ¦ Strength : extension, flexion, ulnar deviation and radial deviation 5/5\par ¦ Stability : no joint instability on provocative testing\par ¦ Tests/Signs : Tinel's sign negative over carpal tunnel, negative Phalen’s and Finkelstein’s test\par \par o Left Hand :\par ¦ Inspection/Palpation : no tenderness over A1 pulley, and no triggering,  no pain with axial compression, .Dupytrens contracture to left 3rd 4th and 5th fingers, hypertrophic changes over basal joint\par ¦ Range of Motion : full arc of motion in the small joints of the hand, no discomfort elicited\par ¦ Strength : all intrinsic and extrinsic hand muscles 5/5\par ¦ Stability : no joint instability on provocative testing \par o Sensation : sensation intact to light touch\par o Skin : no skin lesions or discoloration\par \par Gait and Station:\par Gait: gait normal, no significant extremity swelling or lymphedema, good proprioception and balance \par \par Radiology Results  \par o Left Hand : AP and lateral views were obtained and reveal widening of the scaphoidal interval, as well as 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

## 2019-07-05 NOTE — DISCUSSION/SUMMARY
[de-identified] : I discussed the underlying pathophysiology of the patient's condition in great detail with the patient. I discussed the patient's X-rays with her in extensive detail. I recommend that she put her hand in a sink with cool water, and using a sponge, squeeze and release. I gave her Coband to pad and support the joint. I told her that she can buy Coband and use it as needed at home for a few weeks, multiple times per day. I advised her not to pull the tape too tight around the fingers, to prevent loss of circulation.\par \par FU PRN

## 2019-07-10 ENCOUNTER — APPOINTMENT (OUTPATIENT)
Dept: FAMILY MEDICINE | Facility: CLINIC | Age: 79
End: 2019-07-10
Payer: MEDICARE

## 2019-07-10 VITALS
OXYGEN SATURATION: 97 % | HEIGHT: 63 IN | BODY MASS INDEX: 35.44 KG/M2 | WEIGHT: 200 LBS | SYSTOLIC BLOOD PRESSURE: 140 MMHG | TEMPERATURE: 98 F | RESPIRATION RATE: 15 BRPM | DIASTOLIC BLOOD PRESSURE: 70 MMHG | HEART RATE: 62 BPM

## 2019-07-10 DIAGNOSIS — E87.5 HYPERKALEMIA: ICD-10-CM

## 2019-07-10 PROCEDURE — 96372 THER/PROPH/DIAG INJ SC/IM: CPT

## 2019-07-10 PROCEDURE — 36415 COLL VENOUS BLD VENIPUNCTURE: CPT

## 2019-07-10 RX ORDER — CYANOCOBALAMIN 1000 UG/ML
1000 INJECTION INTRAMUSCULAR; SUBCUTANEOUS
Qty: 0 | Refills: 0 | Status: COMPLETED | OUTPATIENT
Start: 2019-07-10

## 2019-07-10 RX ADMIN — CYANOCOBALAMIN 0 MCG/ML: 1000 INJECTION INTRAMUSCULAR; SUBCUTANEOUS at 00:00

## 2019-07-10 NOTE — HISTORY OF PRESENT ILLNESS
[FreeTextEntry1] : cc: f/u blood pressure and potassium level  level, needs Vit B 12, RF, A,fib  [de-identified] : Patient came to f/u on her BP - she did not take her meds this AM , kenzie SANTOYO, N,V, was educated to take them daily. Her K is trending mikey. Last BW d/w the  pt at length .  Increased hydration still was recommended, no NSAID's  .  She denies CP, SOB,abd pain, no N,V,C. HR controlled, denies palpiations.

## 2019-07-10 NOTE — COUNSELING
[Activity counseling provided] : activity [Low Fat Diet] : Low fat diet [Needs reinforcement, provided] : Patient needs reinforcement on understanding of disease, goals and obesity follow-up plan; reinforcement was provided [Decrease Portions] : Decrease food portions [Low Salt Diet] : Low salt diet [Good understanding] : Patient has a good understanding of lifestyle changes and the steps needed to achieve self management goals

## 2019-07-10 NOTE — PHYSICAL EXAM
[No Respiratory Distress] : no respiratory distress  [Clear to Auscultation] : lungs were clear to auscultation bilaterally [No Accessory Muscle Use] : no accessory muscle use [Regular Rhythm] : with a regular rhythm [Normal Rate] : normal rate  [Normal S1, S2] : normal S1 and S2 [No Murmur] : no murmur heard [No Varicosities] : no varicosities [Soft] : abdomen soft [Non Tender] : non-tender [No Edema] : there was no peripheral edema [Non-distended] : non-distended [No Masses] : no abdominal mass palpated [No HSM] : no HSM [No Joint Swelling] : no joint swelling [Normal Bowel Sounds] : normal bowel sounds [Grossly Normal Strength/Tone] : grossly normal strength/tone [Alert and Oriented x3] : oriented to person, place, and time [Normal Gait] : normal gait [de-identified] : obese

## 2019-07-10 NOTE — ASSESSMENT
[FreeTextEntry1] : Continue the same meds,\par  Vit B 12 given \par f/u blood work done in the Office\par no NSAID's\par cont increased hydration \par f/u in 2 weeks\par take medication DAILY\par low fat, salt and sugar diet\par weight loss

## 2019-07-11 PROBLEM — E87.5 HYPERKALEMIA: Status: ACTIVE | Noted: 2019-06-10

## 2019-07-11 LAB
ALBUMIN SERPL ELPH-MCNC: 4.3 G/DL
ALP BLD-CCNC: 43 U/L
ALT SERPL-CCNC: 15 U/L
ANION GAP SERPL CALC-SCNC: 13 MMOL/L
AST SERPL-CCNC: 16 U/L
BASOPHILS # BLD AUTO: 0.04 K/UL
BASOPHILS NFR BLD AUTO: 0.6 %
BILIRUB SERPL-MCNC: 0.2 MG/DL
BUN SERPL-MCNC: 26 MG/DL
CALCIUM SERPL-MCNC: 9.4 MG/DL
CHLORIDE SERPL-SCNC: 109 MMOL/L
CO2 SERPL-SCNC: 16 MMOL/L
CREAT SERPL-MCNC: 1.13 MG/DL
EOSINOPHIL # BLD AUTO: 0.09 K/UL
EOSINOPHIL NFR BLD AUTO: 1.3 %
GLUCOSE SERPL-MCNC: 135 MG/DL
HCT VFR BLD CALC: 39.4 %
HGB BLD-MCNC: 12.4 G/DL
IMM GRANULOCYTES NFR BLD AUTO: 0.4 %
LYMPHOCYTES # BLD AUTO: 1.84 K/UL
LYMPHOCYTES NFR BLD AUTO: 26.7 %
MAGNESIUM SERPL-MCNC: 1.6 MG/DL
MAN DIFF?: NORMAL
MCHC RBC-ENTMCNC: 30.7 PG
MCHC RBC-ENTMCNC: 31.5 GM/DL
MCV RBC AUTO: 97.5 FL
MONOCYTES # BLD AUTO: 0.56 K/UL
MONOCYTES NFR BLD AUTO: 8.1 %
NEUTROPHILS # BLD AUTO: 4.34 K/UL
NEUTROPHILS NFR BLD AUTO: 62.9 %
PLATELET # BLD AUTO: 254 K/UL
POTASSIUM SERPL-SCNC: 4.9 MMOL/L
PROT SERPL-MCNC: 6.9 G/DL
RBC # BLD: 4.04 M/UL
RBC # FLD: 13.2 %
SODIUM SERPL-SCNC: 138 MMOL/L
VIT B12 SERPL-MCNC: 412 PG/ML
WBC # FLD AUTO: 6.9 K/UL

## 2019-07-11 NOTE — DISCUSSION/SUMMARY
[FreeTextEntry1] : Patient informed re: blood work results K mildly elevated - pt off ACE, advised to avoid K rich food, Cr - improved, F/u in 1 week.

## 2019-07-11 NOTE — HISTORY OF PRESENT ILLNESS
[FreeTextEntry1] : cc: f/u on her  pressure, K level, needs Vit B 12, Renal function \par  [de-identified] : Patient presented  to f/u check   K level . She is  Diltiazem daily, low Na diet. Patient is doing well , no CP, no SOB , no abd pain, no N,V,C. Patient  urinary urgency decreased after she started medication. Seen by Ortho re: finger problem, now much better.

## 2019-07-11 NOTE — COUNSELING
[Activity counseling provided] : activity [Good understanding] : Patient has a good understanding of disease, goals and obesity follow-up plan [Low Fat Diet] : Low fat diet [Low Salt Diet] : Low salt diet [Decrease Portions] : Decrease food portions [Weight Self Once Weekly] : Weight self once weekly [Walking] : Walking

## 2019-07-11 NOTE — PHYSICAL EXAM
[No Respiratory Distress] : no respiratory distress  [Clear to Auscultation] : lungs were clear to auscultation bilaterally [Normal Rate] : normal rate  [No Accessory Muscle Use] : no accessory muscle use [Regular Rhythm] : with a regular rhythm [No Murmur] : no murmur heard [Normal S1, S2] : normal S1 and S2 [No Edema] : there was no peripheral edema [No Varicosities] : no varicosities [Non Tender] : non-tender [Soft] : abdomen soft [Non-distended] : non-distended [No Masses] : no abdominal mass palpated [No HSM] : no HSM [No Joint Swelling] : no joint swelling [Normal Bowel Sounds] : normal bowel sounds [Grossly Normal Strength/Tone] : grossly normal strength/tone [Normal Gait] : normal gait [Alert and Oriented x3] : oriented to person, place, and time [de-identified] : obese

## 2019-07-29 ENCOUNTER — RX CHANGE (OUTPATIENT)
Age: 79
End: 2019-07-29

## 2019-08-07 ENCOUNTER — APPOINTMENT (OUTPATIENT)
Dept: FAMILY MEDICINE | Facility: CLINIC | Age: 79
End: 2019-08-07
Payer: MEDICARE

## 2019-08-07 VITALS
TEMPERATURE: 97.7 F | HEIGHT: 62 IN | WEIGHT: 195 LBS | SYSTOLIC BLOOD PRESSURE: 126 MMHG | BODY MASS INDEX: 35.88 KG/M2 | DIASTOLIC BLOOD PRESSURE: 64 MMHG | RESPIRATION RATE: 17 BRPM | OXYGEN SATURATION: 97 % | HEART RATE: 70 BPM

## 2019-08-07 DIAGNOSIS — R10.9 UNSPECIFIED ABDOMINAL PAIN: ICD-10-CM

## 2019-08-07 DIAGNOSIS — Z00.00 ENCOUNTER FOR GENERAL ADULT MEDICAL EXAMINATION W/OUT ABNORMAL FINDINGS: ICD-10-CM

## 2019-08-07 PROCEDURE — 99214 OFFICE O/P EST MOD 30 MIN: CPT | Mod: 25

## 2019-08-07 PROCEDURE — 96372 THER/PROPH/DIAG INJ SC/IM: CPT

## 2019-08-07 RX ORDER — TOLTERODINE TARTRATE 1 MG/1
1 TABLET ORAL DAILY
Qty: 30 | Refills: 0 | Status: DISCONTINUED | COMMUNITY
Start: 2019-06-24 | End: 2019-08-07

## 2019-08-07 RX ORDER — CYANOCOBALAMIN 1000 UG/ML
1000 INJECTION INTRAMUSCULAR; SUBCUTANEOUS
Qty: 0 | Refills: 0 | Status: COMPLETED | OUTPATIENT
Start: 2019-08-07

## 2019-08-07 RX ADMIN — CYANOCOBALAMIN 0 MCG/ML: 1000 INJECTION, SOLUTION INTRAMUSCULAR at 00:00

## 2019-08-07 NOTE — HEALTH RISK ASSESSMENT
[Independent] : managing medications [Yes] : Yes [Monthly or less (1 pt)] : Monthly or less (1 point) [1 or 2 (0 pts)] : 1 or 2 (0 points) [Never (0 pts)] : Never (0 points) [No] : In the past 12 months have you used drugs other than those required for medical reasons? No [No falls in past year] : Patient reported no falls in the past year [0] : 2) Feeling down, depressed, or hopeless: Not at all (0) [] : No [Audit-CScore] : 1 [AFP2Pyrlg] : 0

## 2019-08-07 NOTE — PHYSICAL EXAM
[No Varicosities] : no varicosities [No Edema] : there was no peripheral edema [Soft] : abdomen soft [Non Tender] : non-tender [Non-distended] : non-distended [No HSM] : no HSM [Normal Bowel Sounds] : normal bowel sounds [Normal] : normal gait, coordination grossly intact, no focal deficits and deep tendon reflexes were 2+ and symmetric

## 2019-08-07 NOTE — HISTORY OF PRESENT ILLNESS
[FreeTextEntry1] : cc: f/u pressure, needs Vit B 12 IM, weight  [de-identified] : Patient presented  to check   her BP, weight,  needs Vit B 12. She is doing well, she c/o stomach discomfort few days ago, now resolved - no fever, no chills, no N,V,C,D, patient had a lots of gas . In 11/18 patient was Hospitalized with stomach problem - abn CT abd - refused then and now again to see GI and get further eval ( no Colonoscopy , no EGD - " if something is broken leave it that way ". Patient denies Cp,SOB,abd pain. patient monitors her diet, portions, lost 5 lb .

## 2019-09-30 ENCOUNTER — RX RENEWAL (OUTPATIENT)
Age: 79
End: 2019-09-30

## 2019-10-02 ENCOUNTER — LABORATORY RESULT (OUTPATIENT)
Age: 79
End: 2019-10-02

## 2019-10-02 ENCOUNTER — CHART COPY (OUTPATIENT)
Age: 79
End: 2019-10-02

## 2019-10-02 ENCOUNTER — APPOINTMENT (OUTPATIENT)
Dept: FAMILY MEDICINE | Facility: CLINIC | Age: 79
End: 2019-10-02
Payer: MEDICARE

## 2019-10-02 VITALS
BODY MASS INDEX: 36.62 KG/M2 | DIASTOLIC BLOOD PRESSURE: 68 MMHG | RESPIRATION RATE: 18 BRPM | WEIGHT: 199 LBS | HEIGHT: 62 IN | SYSTOLIC BLOOD PRESSURE: 132 MMHG | OXYGEN SATURATION: 98 % | HEART RATE: 66 BPM

## 2019-10-02 DIAGNOSIS — Z87.19 PERSONAL HISTORY OF OTHER DISEASES OF THE DIGESTIVE SYSTEM: ICD-10-CM

## 2019-10-02 DIAGNOSIS — R35.0 FREQUENCY OF MICTURITION: ICD-10-CM

## 2019-10-02 PROCEDURE — 36415 COLL VENOUS BLD VENIPUNCTURE: CPT

## 2019-10-02 PROCEDURE — G0439: CPT

## 2019-10-02 PROCEDURE — G0444 DEPRESSION SCREEN ANNUAL: CPT | Mod: 59

## 2019-10-04 LAB
25(OH)D3 SERPL-MCNC: 27.4 NG/ML
ALBUMIN SERPL ELPH-MCNC: 4.4 G/DL
ALP BLD-CCNC: 44 U/L
ALT SERPL-CCNC: 11 U/L
ANION GAP SERPL CALC-SCNC: 14 MMOL/L
APPEARANCE: ABNORMAL
AST SERPL-CCNC: 17 U/L
BASOPHILS # BLD AUTO: 0.06 K/UL
BASOPHILS NFR BLD AUTO: 0.8 %
BILIRUB SERPL-MCNC: 0.2 MG/DL
BILIRUBIN URINE: NEGATIVE
BLOOD URINE: ABNORMAL
BUN SERPL-MCNC: 31 MG/DL
CALCIUM SERPL-MCNC: 9.6 MG/DL
CHLORIDE SERPL-SCNC: 104 MMOL/L
CHOLEST SERPL-MCNC: 129 MG/DL
CHOLEST/HDLC SERPL: 1.9 RATIO
CO2 SERPL-SCNC: 20 MMOL/L
COLOR: YELLOW
CREAT SERPL-MCNC: 1.46 MG/DL
EOSINOPHIL # BLD AUTO: 0.11 K/UL
EOSINOPHIL NFR BLD AUTO: 1.5 %
ESTIMATED AVERAGE GLUCOSE: 146 MG/DL
FOLATE SERPL-MCNC: 9.5 NG/ML
GLUCOSE QUALITATIVE U: NEGATIVE
GLUCOSE SERPL-MCNC: 145 MG/DL
HBA1C MFR BLD HPLC: 6.7 %
HCT VFR BLD CALC: 40.9 %
HDLC SERPL-MCNC: 67 MG/DL
HGB BLD-MCNC: 13 G/DL
IMM GRANULOCYTES NFR BLD AUTO: 0.4 %
KETONES URINE: NEGATIVE
LDLC SERPL CALC-MCNC: 46 MG/DL
LEUKOCYTE ESTERASE URINE: NEGATIVE
LYMPHOCYTES # BLD AUTO: 2.08 K/UL
LYMPHOCYTES NFR BLD AUTO: 28.8 %
MAGNESIUM SERPL-MCNC: 1.5 MG/DL
MAN DIFF?: NORMAL
MCHC RBC-ENTMCNC: 30.7 PG
MCHC RBC-ENTMCNC: 31.8 GM/DL
MCV RBC AUTO: 96.5 FL
MONOCYTES # BLD AUTO: 0.56 K/UL
MONOCYTES NFR BLD AUTO: 7.8 %
NEUTROPHILS # BLD AUTO: 4.37 K/UL
NEUTROPHILS NFR BLD AUTO: 60.7 %
NITRITE URINE: NEGATIVE
PH URINE: 5.5
PLATELET # BLD AUTO: 274 K/UL
POTASSIUM SERPL-SCNC: 5.1 MMOL/L
PROT SERPL-MCNC: 7 G/DL
PROTEIN URINE: NEGATIVE
RBC # BLD: 4.24 M/UL
RBC # FLD: 12.2 %
SODIUM SERPL-SCNC: 138 MMOL/L
SPECIFIC GRAVITY URINE: 1.02
TRIGL SERPL-MCNC: 82 MG/DL
TSH SERPL-ACNC: 1.96 UIU/ML
URATE SERPL-MCNC: 6.2 MG/DL
UROBILINOGEN URINE: NORMAL
VIT B12 SERPL-MCNC: 559 PG/ML
WBC # FLD AUTO: 7.21 K/UL

## 2019-10-06 PROBLEM — R35.0 URINARY FREQUENCY: Status: ACTIVE | Noted: 2017-10-23

## 2019-10-06 NOTE — HEALTH RISK ASSESSMENT
[Good] : ~his/her~  mood as  good [Yes] : Yes [Monthly or less (1 pt)] : Monthly or less (1 point) [1 or 2 (0 pts)] : 1 or 2 (0 points) [Never (0 pts)] : Never (0 points) [No] : In the past 12 months have you used drugs other than those required for medical reasons? No [No falls in past year] : Patient reported no falls in the past year [0] : 2) Feeling down, depressed, or hopeless: Not at all (0) [No Retinopathy] : No retinopathy [Patient reported mammogram was normal] : Patient reported mammogram was normal [Patient reported PAP Smear was normal] : Patient reported PAP Smear was normal [Patient reported bone density results were abnormal] : Patient reported bone density results were abnormal [Patient reported colonoscopy was normal] : Patient reported colonoscopy was normal [With Family] : lives with family [None] : None [Retired] : retired [High School] : high school [] :  [# Of Children ___] : has [unfilled] children [Feels Safe at Home] : Feels safe at home [Independent] : managing finances [Carbon Monoxide Detector] : carbon monoxide detector [Seat Belt] :  uses seat belt [Sunscreen] : uses sunscreen [With Patient/Caregiver] : With Patient/Caregiver [DNR] : DNR [DNI] : DNI [FreeTextEntry1] : none [] : No [de-identified] : No [de-identified] : Cardiol, Ortho [Audit-CScore] : 0 [de-identified] : walking , swimming  [[id number]] : 0 [de-identified] : regular  [EyeExamDate] : 04/19 [Change in mental status noted] : No change in mental status noted [Language] : denies difficulty with language [Reports changes in dental health] : Reports no changes in dental health [Reports changes in vision] : Reports no changes in vision [Reports changes in hearing] : Reports no changes in hearing [Smoke Detector] : no smoke detector [MammogramDate] : 01/19 [PapSmearDate] : 01/19 [BoneDensityDate] : 01/19 [ColonoscopyDate] : 11/18 [BoneDensityComments] : Osteoporosis - on Fosamax  [HepatitisCDate] : 06/17 [HIVDate] : 12/18 [AdvancecareDate] : 10/19

## 2019-10-06 NOTE — PHYSICAL EXAM
[Comprehensive Foot Exam Normal] : Right and left foot were examined and both feet are normal. No ulcers in either foot. Toes are normal and with full ROM.  Normal tactile sensation with monofilament testing throughout both feet [No Varicosities] : no varicosities [No Nipple Discharge] : no nipple discharge [Normal Appearance] : normal in appearance [No Masses] : no palpable masses [No Axillary Lymphadenopathy] : no axillary lymphadenopathy [No Rash] : no rash [No Focal Deficits] : no focal deficits [Coordination Grossly Intact] : coordination grossly intact [Normal Gait] : normal gait [Normal Affect] : the affect was normal [Normal Insight/Judgement] : insight and judgment were intact [Alert and Oriented x3] : oriented to person, place, and time [de-identified] : obese [de-identified] : =b/l trace edema  [Normal] : affect was normal and insight and judgment were intact [de-identified] : + b/l LE venous stasis

## 2019-10-06 NOTE — COUNSELING
[Fall prevention counseling provided] : Fall prevention counseling provided [Adequate lighting] : Adequate lighting [No throw rugs] : No throw rugs [Use proper foot wear] : Use proper foot wear [Use recommended devices] : Use recommended devices [Benefits of weight loss discussed] : Benefits of weight loss discussed [Potential consequences of obesity discussed] : Potential consequences of obesity discussed [Encouraged to increase physical activity] : Encouraged to increase physical activity [Weigh Self Weekly] : weigh self weekly [Keep Food Diary] : keep food diary [Decrease Portions] : decrease portions [Good understanding] : Patient has a good understanding of lifestyle changes and steps needed to achieve self management goal

## 2019-10-06 NOTE — HISTORY OF PRESENT ILLNESS
[FreeTextEntry1] : cc: physical  [de-identified] : Patient came  for physical. She denies CP,SOB,Abd pain, no N,V,C,D.Doing well. She c/o frequent urination , few days,no fever  no chills, no back pain , no dysuria.

## 2019-10-22 ENCOUNTER — RX RENEWAL (OUTPATIENT)
Age: 79
End: 2019-10-22

## 2019-12-09 ENCOUNTER — RX RENEWAL (OUTPATIENT)
Age: 79
End: 2019-12-09

## 2020-01-08 ENCOUNTER — APPOINTMENT (OUTPATIENT)
Dept: FAMILY MEDICINE | Facility: CLINIC | Age: 80
End: 2020-01-08
Payer: MEDICARE

## 2020-01-08 VITALS
BODY MASS INDEX: 36.62 KG/M2 | HEART RATE: 69 BPM | WEIGHT: 199 LBS | HEIGHT: 62 IN | DIASTOLIC BLOOD PRESSURE: 74 MMHG | OXYGEN SATURATION: 100 % | SYSTOLIC BLOOD PRESSURE: 120 MMHG | RESPIRATION RATE: 17 BRPM

## 2020-01-08 PROCEDURE — 99214 OFFICE O/P EST MOD 30 MIN: CPT | Mod: 25

## 2020-01-08 PROCEDURE — 36415 COLL VENOUS BLD VENIPUNCTURE: CPT

## 2020-01-08 NOTE — PHYSICAL EXAM
[No Varicosities] : no varicosities [Normal] : affect was normal and insight and judgment were intact [Alert and Oriented x3] : oriented to person, place, and time [de-identified] : obese  [de-identified] : trace b/l LE edema

## 2020-01-08 NOTE — COUNSELING
[Fall prevention counseling provided] : Fall prevention counseling provided [Adequate lighting] : Adequate lighting [No throw rugs] : No throw rugs [Use proper foot wear] : Use proper foot wear [Use recommended devices] : Use recommended devices [Weigh Self Weekly] : weigh self weekly [Encouraged to maintain food diary] : Encouraged to maintain food diary [Benefits of weight loss discussed] : Benefits of weight loss discussed [Decrease Portions] : decrease portions [Keep Food Diary] : keep food diary [Good understanding] : Patient has a good understanding of lifestyle changes and steps needed to achieve self management goal

## 2020-01-08 NOTE — HEALTH RISK ASSESSMENT
[No falls in past year] : Patient reported no falls in the past year [No] : In the past 12 months have you used drugs other than those required for medical reasons? No [0] : 1) Little interest or pleasure doing things: Not at all (0) [Audit-CScore] : 0 [] : No [de-identified] : walking  [de-identified] : regular  [[id number]] : 0

## 2020-01-08 NOTE — HISTORY OF PRESENT ILLNESS
[FreeTextEntry1] : cc: f/u diabetes, blood pressure,  weight  [de-identified] : Patient presented to f/u on her multiple problems, doing great, She brought her DNR/DNI form today, needs meds renewal, blood work. She denies CP,SOB,Abd pain, no N,V,C,D.Patient has problem with her foot - f/u with Podiatrist

## 2020-01-13 LAB
25(OH)D3 SERPL-MCNC: 33.6 NG/ML
ALBUMIN SERPL ELPH-MCNC: 4.4 G/DL
ALP BLD-CCNC: 44 U/L
ALT SERPL-CCNC: 10 U/L
ANION GAP SERPL CALC-SCNC: 15 MMOL/L
APPEARANCE: CLEAR
AST SERPL-CCNC: 17 U/L
BASOPHILS # BLD AUTO: 0.05 K/UL
BASOPHILS NFR BLD AUTO: 0.7 %
BILIRUB SERPL-MCNC: 0.3 MG/DL
BILIRUBIN URINE: NEGATIVE
BLOOD URINE: NEGATIVE
BUN SERPL-MCNC: 36 MG/DL
CALCIUM SERPL-MCNC: 9.7 MG/DL
CHLORIDE SERPL-SCNC: 104 MMOL/L
CHOLEST SERPL-MCNC: 125 MG/DL
CHOLEST/HDLC SERPL: 1.9 RATIO
CO2 SERPL-SCNC: 20 MMOL/L
COLOR: NORMAL
CREAT SERPL-MCNC: 1.42 MG/DL
CREAT SPEC-SCNC: 126 MG/DL
EOSINOPHIL # BLD AUTO: 0.12 K/UL
EOSINOPHIL NFR BLD AUTO: 1.6 %
ESTIMATED AVERAGE GLUCOSE: 143 MG/DL
FOLATE SERPL-MCNC: 8.6 NG/ML
GLUCOSE QUALITATIVE U: NEGATIVE
GLUCOSE SERPL-MCNC: 162 MG/DL
HBA1C MFR BLD HPLC: 6.6 %
HCT VFR BLD CALC: 40.5 %
HDLC SERPL-MCNC: 67 MG/DL
HGB BLD-MCNC: 13 G/DL
IMM GRANULOCYTES NFR BLD AUTO: 0.5 %
KETONES URINE: NEGATIVE
LDLC SERPL CALC-MCNC: 46 MG/DL
LEUKOCYTE ESTERASE URINE: ABNORMAL
LYMPHOCYTES # BLD AUTO: 2.53 K/UL
LYMPHOCYTES NFR BLD AUTO: 34 %
MAGNESIUM SERPL-MCNC: 1.5 MG/DL
MAN DIFF?: NORMAL
MCHC RBC-ENTMCNC: 30.7 PG
MCHC RBC-ENTMCNC: 32.1 GM/DL
MCV RBC AUTO: 95.7 FL
MICROALBUMIN 24H UR DL<=1MG/L-MCNC: <1.2 MG/DL
MICROALBUMIN/CREAT 24H UR-RTO: NORMAL MG/G
MONOCYTES # BLD AUTO: 0.61 K/UL
MONOCYTES NFR BLD AUTO: 8.2 %
NEUTROPHILS # BLD AUTO: 4.09 K/UL
NEUTROPHILS NFR BLD AUTO: 55 %
NITRITE URINE: NEGATIVE
PH URINE: 5
PLATELET # BLD AUTO: 297 K/UL
POTASSIUM SERPL-SCNC: 4.7 MMOL/L
PROT SERPL-MCNC: 7.3 G/DL
PROTEIN URINE: NEGATIVE
RBC # BLD: 4.23 M/UL
RBC # FLD: 12.9 %
SODIUM SERPL-SCNC: 139 MMOL/L
SPECIFIC GRAVITY URINE: 1.02
TRIGL SERPL-MCNC: 62 MG/DL
TSH SERPL-ACNC: 2.21 UIU/ML
URATE SERPL-MCNC: 6.3 MG/DL
UROBILINOGEN URINE: NORMAL
VIT B12 SERPL-MCNC: 735 PG/ML
WBC # FLD AUTO: 7.44 K/UL

## 2020-01-26 ENCOUNTER — RX RENEWAL (OUTPATIENT)
Age: 80
End: 2020-01-26

## 2020-04-01 ENCOUNTER — APPOINTMENT (OUTPATIENT)
Dept: FAMILY MEDICINE | Facility: CLINIC | Age: 80
End: 2020-04-01

## 2020-05-18 ENCOUNTER — APPOINTMENT (OUTPATIENT)
Dept: FAMILY MEDICINE | Facility: CLINIC | Age: 80
End: 2020-05-18
Payer: MEDICARE

## 2020-05-18 VITALS
SYSTOLIC BLOOD PRESSURE: 142 MMHG | HEART RATE: 64 BPM | DIASTOLIC BLOOD PRESSURE: 70 MMHG | OXYGEN SATURATION: 100 % | RESPIRATION RATE: 18 BRPM | BODY MASS INDEX: 36.62 KG/M2 | TEMPERATURE: 97.7 F | WEIGHT: 199 LBS | HEIGHT: 62 IN

## 2020-05-18 DIAGNOSIS — Z11.59 ENCOUNTER FOR SCREENING FOR OTHER VIRAL DISEASES: ICD-10-CM

## 2020-05-18 PROCEDURE — 36415 COLL VENOUS BLD VENIPUNCTURE: CPT

## 2020-05-18 PROCEDURE — 99214 OFFICE O/P EST MOD 30 MIN: CPT | Mod: 25

## 2020-05-18 PROCEDURE — 96372 THER/PROPH/DIAG INJ SC/IM: CPT

## 2020-05-18 RX ORDER — CYANOCOBALAMIN 1000 UG/ML
1000 INJECTION INTRAMUSCULAR; SUBCUTANEOUS
Qty: 0 | Refills: 0 | Status: COMPLETED | OUTPATIENT
Start: 2020-05-18

## 2020-05-18 RX ADMIN — CYANOCOBALAMIN 0 MCG/ML: 1000 INJECTION, SOLUTION INTRAMUSCULAR; SUBCUTANEOUS at 00:00

## 2020-05-18 NOTE — HISTORY OF PRESENT ILLNESS
[FreeTextEntry1] : cc: f/u pressure, diabetes, insomnia , renal function, weight , Vit B12  and VIt D level  [de-identified] : PAtient is doing well, denjeromes CP,SOB,abd pain, Takes her meds daily, Patient wants to check herself for COVID Ab.

## 2020-05-18 NOTE — PHYSICAL EXAM
[No Varicosities] : no varicosities [No Edema] : there was no peripheral edema [Alert and Oriented x3] : oriented to person, place, and time [Normal] : affect was normal and insight and judgment were intact [de-identified] : obese

## 2020-05-18 NOTE — REVIEW OF SYSTEMS
[Suicidal] : not suicidal [Anxiety] : no anxiety [Insomnia] : insomnia [Depression] : no depression [Negative] : Gastrointestinal

## 2020-05-19 LAB
25(OH)D3 SERPL-MCNC: 38.4 NG/ML
ALBUMIN SERPL ELPH-MCNC: 4.5 G/DL
ALP BLD-CCNC: 46 U/L
ALT SERPL-CCNC: 16 U/L
ANION GAP SERPL CALC-SCNC: 13 MMOL/L
APPEARANCE: CLEAR
AST SERPL-CCNC: 21 U/L
BASOPHILS # BLD AUTO: 0.06 K/UL
BASOPHILS NFR BLD AUTO: 0.7 %
BILIRUB SERPL-MCNC: 0.2 MG/DL
BILIRUBIN URINE: NEGATIVE
BLOOD URINE: NEGATIVE
BUN SERPL-MCNC: 37 MG/DL
CALCIUM SERPL-MCNC: 10.1 MG/DL
CHLORIDE SERPL-SCNC: 104 MMOL/L
CHOLEST SERPL-MCNC: 134 MG/DL
CHOLEST/HDLC SERPL: 2.1 RATIO
CO2 SERPL-SCNC: 19 MMOL/L
COLOR: COLORLESS
CREAT SERPL-MCNC: 1.21 MG/DL
EOSINOPHIL # BLD AUTO: 0.13 K/UL
EOSINOPHIL NFR BLD AUTO: 1.5 %
ESTIMATED AVERAGE GLUCOSE: 140 MG/DL
FOLATE SERPL-MCNC: 13.6 NG/ML
GLUCOSE QUALITATIVE U: NEGATIVE
GLUCOSE SERPL-MCNC: 135 MG/DL
HBA1C MFR BLD HPLC: 6.5 %
HCT VFR BLD CALC: 43 %
HDLC SERPL-MCNC: 64 MG/DL
HGB BLD-MCNC: 13.7 G/DL
IMM GRANULOCYTES NFR BLD AUTO: 0.5 %
KETONES URINE: NEGATIVE
LDLC SERPL CALC-MCNC: 51 MG/DL
LEUKOCYTE ESTERASE URINE: NEGATIVE
LYMPHOCYTES # BLD AUTO: 2.23 K/UL
LYMPHOCYTES NFR BLD AUTO: 26.2 %
MAGNESIUM SERPL-MCNC: 1.6 MG/DL
MAN DIFF?: NORMAL
MCHC RBC-ENTMCNC: 30.3 PG
MCHC RBC-ENTMCNC: 31.9 GM/DL
MCV RBC AUTO: 95.1 FL
MONOCYTES # BLD AUTO: 0.62 K/UL
MONOCYTES NFR BLD AUTO: 7.3 %
NEUTROPHILS # BLD AUTO: 5.43 K/UL
NEUTROPHILS NFR BLD AUTO: 63.8 %
NITRITE URINE: NEGATIVE
PH URINE: 5
PLATELET # BLD AUTO: 270 K/UL
POTASSIUM SERPL-SCNC: 5.5 MMOL/L
PROT SERPL-MCNC: 7.2 G/DL
PROTEIN URINE: NEGATIVE
RBC # BLD: 4.52 M/UL
RBC # FLD: 12.8 %
SARS-COV-2 IGG SERPL IA-ACNC: <0.1 INDEX
SARS-COV-2 IGG SERPL QL IA: NEGATIVE
SODIUM SERPL-SCNC: 135 MMOL/L
SPECIFIC GRAVITY URINE: 1.01
TRIGL SERPL-MCNC: 97 MG/DL
TSH SERPL-ACNC: 1.3 UIU/ML
URATE SERPL-MCNC: 5.7 MG/DL
UROBILINOGEN URINE: NORMAL
VIT B12 SERPL-MCNC: 1041 PG/ML
WBC # FLD AUTO: 8.51 K/UL

## 2020-05-20 LAB
CREAT SPEC-SCNC: 44 MG/DL
MICROALBUMIN 24H UR DL<=1MG/L-MCNC: <1.2 MG/DL
MICROALBUMIN/CREAT 24H UR-RTO: NORMAL MG/G

## 2020-06-10 ENCOUNTER — RX RENEWAL (OUTPATIENT)
Age: 80
End: 2020-06-10

## 2020-08-03 ENCOUNTER — APPOINTMENT (OUTPATIENT)
Dept: FAMILY MEDICINE | Facility: CLINIC | Age: 80
End: 2020-08-03
Payer: MEDICARE

## 2020-08-03 VITALS
HEIGHT: 62 IN | WEIGHT: 199 LBS | RESPIRATION RATE: 15 BRPM | HEART RATE: 86 BPM | DIASTOLIC BLOOD PRESSURE: 83 MMHG | TEMPERATURE: 97.5 F | SYSTOLIC BLOOD PRESSURE: 127 MMHG | BODY MASS INDEX: 36.62 KG/M2 | OXYGEN SATURATION: 96 %

## 2020-08-03 DIAGNOSIS — E11.9 TYPE 2 DIABETES MELLITUS W/OUT COMPLICATIONS: ICD-10-CM

## 2020-08-03 DIAGNOSIS — R60.0 LOCALIZED EDEMA: ICD-10-CM

## 2020-08-03 DIAGNOSIS — S81.812A LACERATION W/OUT FOREIGN BODY, LEFT LOWER LEG, INITIAL ENCOUNTER: ICD-10-CM

## 2020-08-03 DIAGNOSIS — N18.3 CHRONIC KIDNEY DISEASE, STAGE 3 (MODERATE): ICD-10-CM

## 2020-08-03 PROCEDURE — 36415 COLL VENOUS BLD VENIPUNCTURE: CPT | Mod: 59

## 2020-08-03 PROCEDURE — 99215 OFFICE O/P EST HI 40 MIN: CPT | Mod: 25

## 2020-08-03 NOTE — HISTORY OF PRESENT ILLNESS
[FreeTextEntry8] : cc; left LE edema , f/u renal function , f/u diabetes \par Pt c/ l eft  LE edema 4 weeks,  getting worse, seen by Dermatol - referred to see PCP, Patient with Hx of edema , in the past. She deneis CP,SOB,Abd pain,  no N,no V,no C. Patient denies fever, no chills, Pt contacted me 4 days ago - she was started on Lasix edema, leg elevation and decreasing NA rich food - edema  almost resolved, She denies Doppler  aware of possibility of DVT - declined. THis AM pt cut her shine Left LE with the razor. Pt takes her med daily

## 2020-08-03 NOTE — PHYSICAL EXAM
[No Acute Distress] : no acute distress [No Varicosities] : no varicosities [No Edema] : there was no peripheral edema [Alert and Oriented x3] : oriented to person, place, and time [Normal] : affect was normal and insight and judgment were intact [de-identified] : obese  [de-identified] : left LE laceration 5 mm , no erythema, mild bleeding

## 2020-08-03 NOTE — REVIEW OF SYSTEMS
[Chest Pain] : no chest pain [Palpitations] : no palpitations [Leg Claudication] : no leg claudication [Lower Ext Edema] : lower extremity edema [Orthopnea] : no orthopnea [Paroxysmal Nocturnal Dyspnea] : no paroxysmal nocturnal dyspnea [Negative] : Heme/Lymph [de-identified] : left shine 5 mm cut, no erythema , no edema,

## 2020-08-04 LAB
ALBUMIN SERPL ELPH-MCNC: 4.6 G/DL
ALP BLD-CCNC: 45 U/L
ALT SERPL-CCNC: 15 U/L
ANION GAP SERPL CALC-SCNC: 16 MMOL/L
AST SERPL-CCNC: 21 U/L
BASOPHILS # BLD AUTO: 0.06 K/UL
BASOPHILS NFR BLD AUTO: 0.7 %
BILIRUB SERPL-MCNC: 0.2 MG/DL
BUN SERPL-MCNC: 43 MG/DL
CALCIUM SERPL-MCNC: 10.5 MG/DL
CHLORIDE SERPL-SCNC: 104 MMOL/L
CO2 SERPL-SCNC: 17 MMOL/L
CREAT SERPL-MCNC: 1.55 MG/DL
EOSINOPHIL # BLD AUTO: 0.08 K/UL
EOSINOPHIL NFR BLD AUTO: 0.9 %
GLUCOSE SERPL-MCNC: 154 MG/DL
HCT VFR BLD CALC: 43.9 %
HGB BLD-MCNC: 14.4 G/DL
IMM GRANULOCYTES NFR BLD AUTO: 0.6 %
LYMPHOCYTES # BLD AUTO: 2.65 K/UL
LYMPHOCYTES NFR BLD AUTO: 29.6 %
MAN DIFF?: NORMAL
MCHC RBC-ENTMCNC: 30.8 PG
MCHC RBC-ENTMCNC: 32.8 GM/DL
MCV RBC AUTO: 93.8 FL
MONOCYTES # BLD AUTO: 0.77 K/UL
MONOCYTES NFR BLD AUTO: 8.6 %
NEUTROPHILS # BLD AUTO: 5.34 K/UL
NEUTROPHILS NFR BLD AUTO: 59.6 %
PLATELET # BLD AUTO: 288 K/UL
POTASSIUM SERPL-SCNC: 5 MMOL/L
PROT SERPL-MCNC: 7.4 G/DL
RBC # BLD: 4.68 M/UL
RBC # FLD: 13.2 %
SODIUM SERPL-SCNC: 138 MMOL/L
WBC # FLD AUTO: 8.95 K/UL

## 2020-10-05 ENCOUNTER — APPOINTMENT (OUTPATIENT)
Dept: FAMILY MEDICINE | Facility: CLINIC | Age: 80
End: 2020-10-05
Payer: MEDICARE

## 2020-10-05 VITALS
SYSTOLIC BLOOD PRESSURE: 132 MMHG | HEIGHT: 62 IN | HEART RATE: 82 BPM | TEMPERATURE: 97.8 F | OXYGEN SATURATION: 96 % | RESPIRATION RATE: 16 BRPM | DIASTOLIC BLOOD PRESSURE: 70 MMHG | WEIGHT: 207 LBS | BODY MASS INDEX: 38.09 KG/M2

## 2020-10-05 DIAGNOSIS — Z23 ENCOUNTER FOR IMMUNIZATION: ICD-10-CM

## 2020-10-05 DIAGNOSIS — E53.8 DEFICIENCY OF OTHER SPECIFIED B GROUP VITAMINS: ICD-10-CM

## 2020-10-05 DIAGNOSIS — I48.0 PAROXYSMAL ATRIAL FIBRILLATION: ICD-10-CM

## 2020-10-05 DIAGNOSIS — I10 ESSENTIAL (PRIMARY) HYPERTENSION: ICD-10-CM

## 2020-10-05 DIAGNOSIS — Z00.00 ENCOUNTER FOR GENERAL ADULT MEDICAL EXAMINATION W/OUT ABNORMAL FINDINGS: ICD-10-CM

## 2020-10-05 PROCEDURE — 93000 ELECTROCARDIOGRAM COMPLETE: CPT

## 2020-10-05 PROCEDURE — 90662 IIV NO PRSV INCREASED AG IM: CPT

## 2020-10-05 PROCEDURE — 36415 COLL VENOUS BLD VENIPUNCTURE: CPT

## 2020-10-05 PROCEDURE — G0008: CPT

## 2020-10-05 PROCEDURE — G0439: CPT

## 2020-10-06 PROBLEM — Z00.00 ANNUAL PHYSICAL EXAM: Status: ACTIVE | Noted: 2019-10-02

## 2020-10-06 PROBLEM — E53.8 VITAMIN B 12 DEFICIENCY: Status: ACTIVE | Noted: 2019-06-10

## 2020-10-06 PROBLEM — Z23 NEEDS FLU SHOT: Status: ACTIVE | Noted: 2020-10-05

## 2020-10-06 PROBLEM — I48.0 PAROXYSMAL ATRIAL FIBRILLATION: Status: ACTIVE | Noted: 2018-12-03

## 2020-10-06 NOTE — PHYSICAL EXAM
[No Acute Distress] : no acute distress [No Varicosities] : no varicosities [No Edema] : there was no peripheral edema [Normal Appearance] : normal in appearance [No Masses] : no palpable masses [No Nipple Discharge] : no nipple discharge [No Axillary Lymphadenopathy] : no axillary lymphadenopathy [Alert and Oriented x3] : oriented to person, place, and time [Normal] : affect was normal and insight and judgment were intact [de-identified] : obese

## 2020-10-06 NOTE — HEALTH RISK ASSESSMENT
[Very Good] : ~his/her~  mood as very good [Yes] : Yes [Monthly or less (1 pt)] : Monthly or less (1 point) [No] : In the past 12 months have you used drugs other than those required for medical reasons? No [No falls in past year] : Patient reported no falls in the past year [0] : 2) Feeling down, depressed, or hopeless: Not at all (0) [No Retinopathy] : No retinopathy [Patient reported mammogram was normal] : Patient reported mammogram was normal [Patient reported PAP Smear was normal] : Patient reported PAP Smear was normal [Patient reported bone density results were abnormal] : Patient reported bone density results were abnormal [None] : None [With Family] : lives with family [Retired] : retired [] :  [# Of Children ___] : has [unfilled] children [Feels Safe at Home] : Feels safe at home [Independent] : managing finances [Some assistance needed] : using transportation [With Patient/Caregiver] : With Patient/Caregiver [FreeTextEntry1] : decrease hearing  [] : No [de-identified] : No [de-identified] : Ophtal, Podiatry  [Audit-CScore] : 0 [de-identified] : walking  [de-identified] : regular  [BAN2Dffof] : 0 [EyeExamDate] : 01/20 [Patient declined colonoscopy] : Patient declined colonoscopy [Change in mental status noted] : No change in mental status noted [Language] : denies difficulty with language [MammogramDate] : 01/19 [PapSmearDate] : 01/19 [BoneDensityDate] : 01/19 [BoneDensityComments] : Osteoporosis  [HIVDate] : 08/17 [HepatitisCDate] : 12/18 [DNR] : DNR [DNI] : DNI [AdvancecareDate] : 10/20

## 2020-10-06 NOTE — COUNSELING
[Fall prevention counseling provided] : Fall prevention counseling provided [Adequate lighting] : Adequate lighting [No throw rugs] : No throw rugs [Use proper foot wear] : Use proper foot wear [Use recommended devices] : Use recommended devices [Benefits of weight loss discussed] : Benefits of weight loss discussed [Encouraged to maintain food diary] : Encouraged to maintain food diary [Encouraged to increase physical activity] : Encouraged to increase physical activity [Encouraged to use exercise tracking device] : Encouraged to use exercise tracking device [Weigh Self Weekly] : weigh self weekly [Decrease Portions] : decrease portions [Keep Food Diary] : keep food diary [Good understanding] : Patient has a good understanding of lifestyle changes and steps needed to achieve self management goal

## 2020-10-06 NOTE — HISTORY OF PRESENT ILLNESS
[FreeTextEntry1] : cc: physical  [de-identified] : Patient came wetness visit, Denies CP,SOB,Abd pain, no N,V,C,D.

## 2020-10-06 NOTE — DATA REVIEWED
[FreeTextEntry1] : EKG- NSR at 64 bpm, no acute ST- T changes \par \par last blood work d/w the pt at length\par

## 2020-10-08 LAB
25(OH)D3 SERPL-MCNC: 43.5 NG/ML
ALBUMIN SERPL ELPH-MCNC: 4.4 G/DL
ALP BLD-CCNC: 48 U/L
ALT SERPL-CCNC: 13 U/L
ANION GAP SERPL CALC-SCNC: 15 MMOL/L
APPEARANCE: CLEAR
AST SERPL-CCNC: 23 U/L
BASOPHILS # BLD AUTO: 0.07 K/UL
BASOPHILS NFR BLD AUTO: 0.9 %
BILIRUB SERPL-MCNC: 0.3 MG/DL
BILIRUBIN URINE: NEGATIVE
BLOOD URINE: NEGATIVE
BUN SERPL-MCNC: 31 MG/DL
CALCIUM SERPL-MCNC: 10.4 MG/DL
CHLORIDE SERPL-SCNC: 102 MMOL/L
CHOLEST SERPL-MCNC: 136 MG/DL
CHOLEST/HDLC SERPL: 2.1 RATIO
CO2 SERPL-SCNC: 20 MMOL/L
COLOR: NORMAL
CREAT SERPL-MCNC: 1.21 MG/DL
CREAT SPEC-SCNC: 65 MG/DL
EOSINOPHIL # BLD AUTO: 0.11 K/UL
EOSINOPHIL NFR BLD AUTO: 1.5 %
ESTIMATED AVERAGE GLUCOSE: 140 MG/DL
FOLATE SERPL-MCNC: 10 NG/ML
GLUCOSE QUALITATIVE U: NEGATIVE
GLUCOSE SERPL-MCNC: 142 MG/DL
HBA1C MFR BLD HPLC: 6.5 %
HCT VFR BLD CALC: 43.5 %
HDLC SERPL-MCNC: 66 MG/DL
HGB BLD-MCNC: 13.7 G/DL
IMM GRANULOCYTES NFR BLD AUTO: 0.5 %
KETONES URINE: NEGATIVE
LDLC SERPL CALC-MCNC: 52 MG/DL
LEUKOCYTE ESTERASE URINE: NEGATIVE
LYMPHOCYTES # BLD AUTO: 2.03 K/UL
LYMPHOCYTES NFR BLD AUTO: 27.3 %
MAN DIFF?: NORMAL
MCHC RBC-ENTMCNC: 30.5 PG
MCHC RBC-ENTMCNC: 31.5 GM/DL
MCV RBC AUTO: 96.9 FL
MICROALBUMIN 24H UR DL<=1MG/L-MCNC: <1.2 MG/DL
MICROALBUMIN/CREAT 24H UR-RTO: NORMAL MG/G
MONOCYTES # BLD AUTO: 0.56 K/UL
MONOCYTES NFR BLD AUTO: 7.5 %
NEUTROPHILS # BLD AUTO: 4.62 K/UL
NEUTROPHILS NFR BLD AUTO: 62.3 %
NITRITE URINE: NEGATIVE
PH URINE: 5
PLATELET # BLD AUTO: 269 K/UL
POTASSIUM SERPL-SCNC: 6 MMOL/L
PROT SERPL-MCNC: 7.5 G/DL
PROTEIN URINE: NEGATIVE
RBC # BLD: 4.49 M/UL
RBC # FLD: 13.1 %
SODIUM SERPL-SCNC: 137 MMOL/L
SPECIFIC GRAVITY URINE: 1.01
TRIGL SERPL-MCNC: 90 MG/DL
TSH SERPL-ACNC: 1.66 UIU/ML
URATE SERPL-MCNC: 5.5 MG/DL
UROBILINOGEN URINE: NORMAL
VIT B12 SERPL-MCNC: 1301 PG/ML
WBC # FLD AUTO: 7.43 K/UL

## 2020-10-23 ENCOUNTER — RX RENEWAL (OUTPATIENT)
Age: 80
End: 2020-10-23

## 2020-11-27 ENCOUNTER — RX RENEWAL (OUTPATIENT)
Age: 80
End: 2020-11-27

## 2020-12-07 ENCOUNTER — APPOINTMENT (OUTPATIENT)
Dept: FAMILY MEDICINE | Facility: CLINIC | Age: 80
End: 2020-12-07
Payer: MEDICARE

## 2020-12-07 VITALS
BODY MASS INDEX: 38.09 KG/M2 | DIASTOLIC BLOOD PRESSURE: 72 MMHG | HEART RATE: 73 BPM | OXYGEN SATURATION: 98 % | WEIGHT: 207 LBS | SYSTOLIC BLOOD PRESSURE: 148 MMHG | RESPIRATION RATE: 16 BRPM | HEIGHT: 62 IN | TEMPERATURE: 97.3 F

## 2020-12-07 DIAGNOSIS — E66.9 OBESITY, UNSPECIFIED: ICD-10-CM

## 2020-12-07 DIAGNOSIS — E11.22 TYPE 2 DIABETES MELLITUS WITH DIABETIC CHRONIC KIDNEY DISEASE: ICD-10-CM

## 2020-12-07 DIAGNOSIS — E78.5 HYPERLIPIDEMIA, UNSPECIFIED: ICD-10-CM

## 2020-12-07 DIAGNOSIS — E61.2 MAGNESIUM DEFICIENCY: ICD-10-CM

## 2020-12-07 DIAGNOSIS — E55.9 VITAMIN D DEFICIENCY, UNSPECIFIED: ICD-10-CM

## 2020-12-07 PROCEDURE — 99215 OFFICE O/P EST HI 40 MIN: CPT | Mod: 25

## 2020-12-07 PROCEDURE — 36415 COLL VENOUS BLD VENIPUNCTURE: CPT

## 2020-12-07 RX ORDER — CHLORHEXIDINE GLUCONATE 4 %
1000 LIQUID (ML) TOPICAL
Qty: 90 | Refills: 0 | Status: ACTIVE | COMMUNITY
Start: 2019-07-10 | End: 1900-01-01

## 2020-12-07 RX ORDER — CALCIUM CARBONATE/VITAMIN D3 500-10/5ML
400 LIQUID (ML) ORAL
Qty: 90 | Refills: 0 | Status: ACTIVE | COMMUNITY
Start: 2019-10-02 | End: 1900-01-01

## 2020-12-07 RX ORDER — FUROSEMIDE 20 MG/1
20 TABLET ORAL
Qty: 30 | Refills: 0 | Status: ACTIVE | COMMUNITY
Start: 2020-07-30 | End: 1900-01-01

## 2020-12-07 RX ORDER — CHOLECALCIFEROL (VITAMIN D3) 50 MCG
50 MCG TABLET ORAL
Qty: 30 | Refills: 0 | Status: ACTIVE | COMMUNITY
Start: 2020-01-08 | End: 1900-01-01

## 2020-12-07 RX ORDER — ALENDRONATE SODIUM 70 MG/1
70 TABLET ORAL
Qty: 12 | Refills: 0 | Status: ACTIVE | COMMUNITY
Start: 2019-03-06 | End: 1900-01-01

## 2020-12-07 RX ORDER — ZOLPIDEM TARTRATE 10 MG/1
10 TABLET ORAL
Qty: 30 | Refills: 3 | Status: ACTIVE | COMMUNITY
Start: 2018-04-18 | End: 1900-01-01

## 2020-12-07 RX ORDER — OXYBUTYNIN CHLORIDE 5 MG/1
5 TABLET ORAL
Qty: 90 | Refills: 0 | Status: ACTIVE | COMMUNITY
Start: 2019-06-26 | End: 1900-01-01

## 2020-12-07 NOTE — REVIEW OF SYSTEMS
[Suicidal] : not suicidal [Insomnia] : insomnia [Anxiety] : no anxiety [Depression] : no depression [Negative] : Gastrointestinal

## 2020-12-07 NOTE — HEALTH RISK ASSESSMENT
[] : No [No] : In the past 12 months have you used drugs other than those required for medical reasons? No [No falls in past year] : Patient reported no falls in the past year [0] : 2) Feeling down, depressed, or hopeless: Not at all (0) [Audit-CScore] : 0 [de-identified] : walking  [de-identified] : regural  [[id number]] : 0

## 2020-12-07 NOTE — HISTORY OF PRESENT ILLNESS
[FreeTextEntry1] : cc: f/u blood pressure, diabetes, insomnia , renal function, weight , Vit def  [de-identified] : Patient is doing well. She will be moving  to [place] in 2 days to live with her daughter, she needs all medication to be send to [facility] in [place].  She  deneis CP,SOB,abd pain, Takes her meds daily.

## 2020-12-07 NOTE — ASSESSMENT
[FreeTextEntry1] : All medication were sent to [facility] \par pt was recommended to find new PCP ASAP \par pt refused Pneumovax today again

## 2020-12-07 NOTE — PHYSICAL EXAM
[No Varicosities] : no varicosities [No Edema] : there was no peripheral edema [Normal Gait] : normal gait [Alert and Oriented x3] : oriented to person, place, and time [Normal] : affect was normal and insight and judgment were intact [de-identified] : obese

## 2020-12-07 NOTE — HISTORY OF PRESENT ILLNESS
[FreeTextEntry1] : cc: f/u blood pressure, diabetes, insomnia , renal function, weight , Vit def  [de-identified] : Patient is doing well. She will be moving  to [place] in 2 days to live with her daughter, she needs all medication to be send to [facility] in [place].  She  deneis CP,SOB,abd pain, Takes her meds daily.

## 2020-12-07 NOTE — PHYSICAL EXAM
[No Varicosities] : no varicosities [No Edema] : there was no peripheral edema [Normal Gait] : normal gait [Alert and Oriented x3] : oriented to person, place, and time [Normal] : affect was normal and insight and judgment were intact [de-identified] : obese

## 2020-12-07 NOTE — ASSESSMENT
[FreeTextEntry1] : All medication were sent to [facility] \par pt was recommended to find new PCP ASAP \par pt refused Pneumovax today again

## 2020-12-14 LAB
25(OH)D3 SERPL-MCNC: 38.8 NG/ML
ALBUMIN SERPL ELPH-MCNC: 4.4 G/DL
ALP BLD-CCNC: 50 U/L
ALT SERPL-CCNC: 10 U/L
ANION GAP SERPL CALC-SCNC: 11 MMOL/L
APPEARANCE: CLEAR
AST SERPL-CCNC: 18 U/L
BASOPHILS # BLD AUTO: 0.06 K/UL
BASOPHILS NFR BLD AUTO: 0.8 %
BILIRUB SERPL-MCNC: 0.2 MG/DL
BILIRUBIN URINE: NEGATIVE
BLOOD URINE: NEGATIVE
BUN SERPL-MCNC: 31 MG/DL
CALCIUM SERPL-MCNC: 9.7 MG/DL
CHLORIDE SERPL-SCNC: 104 MMOL/L
CHOLEST SERPL-MCNC: 118 MG/DL
CO2 SERPL-SCNC: 21 MMOL/L
COLOR: NORMAL
CREAT SERPL-MCNC: 1.18 MG/DL
EOSINOPHIL # BLD AUTO: 0.11 K/UL
EOSINOPHIL NFR BLD AUTO: 1.4 %
ESTIMATED AVERAGE GLUCOSE: 154 MG/DL
FOLATE SERPL-MCNC: 7.5 NG/ML
GLUCOSE QUALITATIVE U: NEGATIVE
GLUCOSE SERPL-MCNC: 159 MG/DL
HBA1C MFR BLD HPLC: 7 %
HCT VFR BLD CALC: 43.5 %
HDLC SERPL-MCNC: 66 MG/DL
HGB BLD-MCNC: 13.6 G/DL
IMM GRANULOCYTES NFR BLD AUTO: 0.4 %
KETONES URINE: NEGATIVE
LDLC SERPL CALC-MCNC: 42 MG/DL
LEUKOCYTE ESTERASE URINE: ABNORMAL
LYMPHOCYTES # BLD AUTO: 2.06 K/UL
LYMPHOCYTES NFR BLD AUTO: 26.3 %
MAN DIFF?: NORMAL
MCHC RBC-ENTMCNC: 31.1 PG
MCHC RBC-ENTMCNC: 31.3 GM/DL
MCV RBC AUTO: 99.3 FL
MONOCYTES # BLD AUTO: 0.56 K/UL
MONOCYTES NFR BLD AUTO: 7.1 %
NEUTROPHILS # BLD AUTO: 5.02 K/UL
NEUTROPHILS NFR BLD AUTO: 64 %
NITRITE URINE: NEGATIVE
NONHDLC SERPL-MCNC: 52 MG/DL
PH URINE: 5.5
PLATELET # BLD AUTO: 275 K/UL
POTASSIUM SERPL-SCNC: 5.9 MMOL/L
PROT SERPL-MCNC: 7 G/DL
PROTEIN URINE: NEGATIVE
RBC # BLD: 4.38 M/UL
RBC # FLD: 12.9 %
SODIUM SERPL-SCNC: 136 MMOL/L
SPECIFIC GRAVITY URINE: 1.02
TRIGL SERPL-MCNC: 52 MG/DL
TSH SERPL-ACNC: 1.64 UIU/ML
URATE SERPL-MCNC: 6.2 MG/DL
UROBILINOGEN URINE: NORMAL
VIT B12 SERPL-MCNC: 1040 PG/ML
WBC # FLD AUTO: 7.84 K/UL

## 2020-12-16 PROBLEM — Z01.419 ENCOUNTER FOR ANNUAL ROUTINE GYNECOLOGICAL EXAMINATION: Status: RESOLVED | Noted: 2018-06-06 | Resolved: 2020-12-16

## 2021-01-01 NOTE — HISTORY OF PRESENT ILLNESS
[de-identified] : 79 year old female presents with left hand pain. She is right hand dominant. She states that she has developed trigger finger on her left ring finger. She states her hand curls up at night, and she has to physically pull her finger straight. She denies locking of the finger. She experiences no numbness and tingling has no pain in the first dorsal compartment. She notes no recent trauma.
0

## 2021-02-10 NOTE — PATIENT PROFILE ADULT - FALLEN IN THE PAST
Caller: Evelio Marissa    Relationship: Self    Best call back number: [phone]    Medication needed:   Requested Prescriptions     Pending Prescriptions Disp Refills   • Juleber 0.15-30 MG-MCG per tablet 28 tablet 11     Sig: Take 1 tablet by mouth Daily.       When do you need the refill by:   ASAP    What details did the patient provide when requesting the medication:   Patient only had a week left of medication    Does the patient have less than a 3 day supply:  [] Yes  [x] No    What is the patient's preferred pharmacy: [facility] [place], [place] - [address] [place] [address] [phone] [facility] [phone]                   no

## 2021-02-19 ENCOUNTER — RX RENEWAL (OUTPATIENT)
Age: 81
End: 2021-02-19

## 2021-02-19 RX ORDER — DILTIAZEM HYDROCHLORIDE 120 MG/1
120 CAPSULE, EXTENDED RELEASE ORAL
Qty: 90 | Refills: 0 | Status: ACTIVE | COMMUNITY
Start: 2018-12-03 | End: 1900-01-01

## 2021-06-02 ENCOUNTER — RX RENEWAL (OUTPATIENT)
Age: 81
End: 2021-06-02

## 2021-06-20 ENCOUNTER — RX RENEWAL (OUTPATIENT)
Age: 81
End: 2021-06-20

## 2021-06-21 ENCOUNTER — RX RENEWAL (OUTPATIENT)
Age: 81
End: 2021-06-21

## 2021-07-06 ENCOUNTER — RX RENEWAL (OUTPATIENT)
Age: 81
End: 2021-07-06

## 2021-08-15 ENCOUNTER — RX RENEWAL (OUTPATIENT)
Age: 81
End: 2021-08-15

## 2021-08-17 ENCOUNTER — RX RENEWAL (OUTPATIENT)
Age: 81
End: 2021-08-17

## 2021-08-26 ENCOUNTER — RX RENEWAL (OUTPATIENT)
Age: 81
End: 2021-08-26

## 2021-09-05 ENCOUNTER — RX RENEWAL (OUTPATIENT)
Age: 81
End: 2021-09-05

## 2021-09-06 ENCOUNTER — RX RENEWAL (OUTPATIENT)
Age: 81
End: 2021-09-06

## 2021-09-09 ENCOUNTER — RX RENEWAL (OUTPATIENT)
Age: 81
End: 2021-09-09

## 2021-10-04 ENCOUNTER — RX RENEWAL (OUTPATIENT)
Age: 81
End: 2021-10-04

## 2021-10-25 ENCOUNTER — RX RENEWAL (OUTPATIENT)
Age: 81
End: 2021-10-25

## 2021-11-16 ENCOUNTER — RX RENEWAL (OUTPATIENT)
Age: 81
End: 2021-11-16

## 2022-05-12 ENCOUNTER — RX RENEWAL (OUTPATIENT)
Age: 82
End: 2022-05-12

## 2022-05-30 ENCOUNTER — RX RENEWAL (OUTPATIENT)
Age: 82
End: 2022-05-30

## 2022-07-05 ENCOUNTER — RX RENEWAL (OUTPATIENT)
Age: 82
End: 2022-07-05

## 2023-01-01 ENCOUNTER — RX RENEWAL (OUTPATIENT)
Age: 83
End: 2023-01-01

## 2023-07-19 NOTE — H&P ADULT - PROBLEM SELECTOR PLAN 2
Outpatient Progress Note      CHIEF COMPLAINT:    Chief Complaint   Patient presents with   • Diabetes   • Ear Pain        HISTORY OF PRESENT ILLNESS:        The patient is a 51 year old female who presents for follow up on chronic medical problems as below:    DM2 Stable  HTN uncontrolled- since off ACE and on Coreg- and cannot remember BID meds  HLD Stable    Hot flashes bad lately.  Ears pain on right.      Past Medical History:   Past Medical History:   Diagnosis Date   • Essential (primary) hypertension    • Gout        Past Surgical History:   Past Surgical History:   Procedure Laterality Date   • Breast biopsy Right     Rt breast bx- was neg approx 7 yrs ago   • Eye surgery     • Hysterectomy      [id number] per patient       Current Medications:    Current Outpatient Medications   Medication Sig Dispense Refill   • loratadine (CLARITIN) 10 MG tablet Take 1 tablet by mouth daily. 90 tablet 0   • metoPROLOL succinate (TOPROL-XL) 50 MG 24 hr tablet Take 1 tablet by mouth daily. 90 tablet 1   • lisinopril (ZESTRIL) 20 MG tablet Take 1 tablet by mouth daily. 90 tablet 1   • DULoxetine (CYMBALTA) 30 MG capsule TAKE 1 CAPSULE BY MOUTH EVERY MORNING 90 capsule 1   • metFORMIN (GLUCOPHAGE-XR) 500 MG 24 hr tablet TAKE 1 TABLET BY MOUTH DAILY 90 tablet 1   • diazePAM (Valium) 5 MG tablet 1 tablet 30 minutes before biopsy and if still anxious repeat second tablet 10 min before procedure , must have someone to drive pt 2 tablet 0   • diazePAM (VALIUM) 5 MG tablet TAKE 1 TABLET BY MOUTH 1 HOUR PRIOR TO PROCEDURE. MAY REPEAT IN 30 MINUTES. MUST HAVE      • oxycodone-acetaminophen (PERCOCET) 7.5-325 MG per tablet Take 1 tablet by mouth in the morning and 1 tablet at noon and 1 tablet in the evening.     • allopurinol (ZYLOPRIM) 300 MG tablet TAKE 1 TABLET BY MOUTH DAILY 90 tablet 1   • omeprazole (PriLOSEC) 40 MG capsule Take 1 capsule by mouth in the morning and 1 capsule in the evening. 90 capsule 3   • simvastatin  (ZOCOR) 20 MG tablet TAKE 1 TABLET BY MOUTH EVERY NIGHT 90 tablet 1   • gabapentin (NEURONTIN) 800 MG tablet Take 1 tablet by mouth daily. 270 tablet 1   • hydrOXYzine (ATARAX) 25 MG tablet Take 1 tablet by mouth every 6 hours as needed for Itching. 360 tablet 1   • neomycin-polymyxin-hydroCORTisone (CORTISPORIN) 3.5-75747-9 otic solution Place 3 drops into both ears 4 times daily. 10 mL 0   • ADVAIR DISKUS 250-50 MCG/DOSE inhaler Take 250 Inhalers by mouth as needed.  0     No current facility-administered medications for this visit.       Allergies:    ALLERGIES:   Allergen Reactions   • Morphine Palpitations   • Sulfa Antibiotics RASH       SOCIAL HISTORY:  Social History     Tobacco Use   • Smoking status: Never   • Smokeless tobacco: Never   Substance Use Topics   • Alcohol use: No   • Drug use: No         Drug Use:    No                FAMILY HISTORY:  Family History   Problem Relation Age of Onset   • Cancer, Breast Mother    • Cancer Mother    • Hypertension Mother    • Cancer Father    • Hypertension Father        REVIEW OF SYSTEMS:   CONSTITUTIONAL:  No Fevers  EYES:  No Visual changes  HEAD/EARS/NOSE/THROAT/MOUTH:  No Headache  RESPIRATORY:  No Cough/Sputum/Shortness of breath/Wheezing  CARDIOVASCULAR:  No Chest pain/Edema/Syncope/Palpitations  GASTROINTESTINAL:  No Abdominal pain/Nausea/Vomiting/Diarrhea/Constipation/Melena/Bright red blood per rectum  GENITOURINARY:  No Dysuria/Frequency/Urgency/Hematuria  INTEGUMENTARY:  No Rashes/Pruritus  MUSCULOSKELETAL:  No Joint pain/Joint swelling  NEUROLOGICAL:  No Numbness/Tingling/Weakness  PSYCHIATRIC:  No Depression/Anxiety    PHYSICAL EXAM:   Visit Vitals  BP (!) 150/84   Pulse 70   Wt (!) 144.2 kg (318 lb)   LMP 05/26/2015   BMI 49.81 kg/m²       CONSTITUTIONAL:  No acute distress, Non-toxic appearing  HEAD, EARS, NOSE, MOUTH, THROAT:  Normocephalic/Atraumatic, External exam of nose and ears is normal- No Lesions/Masses/Tenderness, External auditory canals  normal bilaterally, Tympanic membrane on right large suppurative effusion  EYES:  Pupils equal, round, reactive to light and accommodation, Extraocular movements intact, Conjunctivae are clear/pink- no signs of inflammation, Lids are normal, No scleral icterus  NECK:  Supple, Normal range of motion  RESPIRATORY:  Clear to auscultation bilaterally, Good air entry, Symmetrical expansion with respiration.  No Accessory muscle use/Retractions, no Wheezes/Rhonchi  CARDIOVASCULAR:  Regular rate and rhythm, Normal S1/S2, No S3/S4, No Murmurs/Rubs/Gallops.  No lower extremity edema or varicosities.  GASTROINTESTINAL:  Scaphoid abdomen.  Soft, Non-tender, Non-distended, Bowel sounds present  MUSCULOSKELETAL:  No Clubbing/Cyanosis/Edema, Capillary Refill<2 seconds  SKIN:  Warm, Dry, Inspection normal with no Rashes/Lesions/Ulcers  PSYCHIATRIC:  Alert and Oriented x 3.      DATA:   Labs:   Hemoglobin A1C (%)   Date Value   10/19/2022 7.1 (H)     Sodium (mmol/L)   Date Value   10/19/2022 136     Potassium (mmol/L)   Date Value   10/19/2022 4.0     Chloride (mmol/L)   Date Value   10/19/2022 103     Carbon Dioxide (mmol/L)   Date Value   10/19/2022 26     BUN (mg/dL)   Date Value   10/19/2022 14     Creatinine (mg/dL)   Date Value   03/22/2023 0.69     Glucose (mg/dL)   Date Value   10/19/2022 167 (H)     MICROALBUMIN, UA (TTL) (mg/dL)   Date Value   08/13/2019 4.17     Creatinine, Urine (mg/dL)   Date Value   10/19/2022 185.00     Microalbumin/ Creatinine Ratio (mg/g)   Date Value   10/19/2022 48.3 (H)     Cholesterol (mg/dL)   Date Value   10/19/2022 214 (H)     HDL (mg/dL)   Date Value   10/19/2022 45 (L)     LDL (mg/dL)   Date Value   10/19/2022 122     Triglycerides (mg/dL)   Date Value   10/19/2022 233 (H)       ASSESSMENT AND PLAN:     Type 2 diabetes mellitus without complication, without long-term current use of insulin (CMD)  (primary encounter diagnosis)  Comment: Stable. Patient advised to continue present medication  management and/or any lifestyle/diet modifications previously discussed.   Plan: Glycohemoglobin            Benign essential HTN  Plan: Poorly Controlled- As ordered, see Orders Placed and Medication Changes.     Mixed hyperlipidemia  Plan: Stable. Patient advised to continue present medication management and/or any lifestyle/diet modifications previously discussed.      OME (otitis media with effusion), right  Comment: Poorly Controlled- As ordered, see Orders Placed and Medication Changes.      Ryley Balderas MD    Although patient meets sever sepsis criteria especially in the setting of UA suggestive of UTI, patient does not appear to have active life threatening infection.  Lactic acidosis most likely a combination of metformin use in the setting of acute dehydration.  Hold metformin, continue hydration and continue to monitor lactate levels.

## 2024-07-02 NOTE — ED ADULT NURSE NOTE - PLAN OF CARE
Anesthesia Post-op Note    Patient: Radha Henderson  Procedure(s) Performed: (OA) COLONOSCOPY   Anesthesia type: MAC    Vitals Value Taken Time   Temp 36.1 °C (97 °F) 07/02/24 1231   Pulse 59 07/02/24 1231   Resp 16 07/02/24 1231   SpO2 100 % 07/02/24 1231   /96 07/02/24 1231         Patient Location: PACU Phase 2  Post-op Vital Signs:stable  Level of Consciousness: participates in exam, awake, oriented, answers questions appropriately and alert  Respiratory Status: spontaneous ventilation and unassisted  Cardiovascular blood pressure returned to baseline and stable  Hydration: euvolemic  Pain Management: well controlled  Handoff: Handoff to receiving clinician was performed and questions were answered  Vomiting: none  Nausea: None  Airway Patency:patent  Post-op Assessment: awake, alert, appropriately conversant, or baseline, no complications, patient tolerated procedure well and no evidence of recall  Comments: Patient very happy with her anesthesia care.      There were no known notable events for this encounter.                       NPO/Position of comfort/Call bell/Explanation of exam/test

## 2024-09-10 NOTE — ED PROVIDER NOTE - NS ED ATTENDING STATEMENT MOD
Sent order for 24 hour urine CYSTINE kit x 1 to [employer] via [employer]. [employer] message sent to patient with instructions for completion.     HARITHA Armstrong  Care Coordinator- Urology   [phone]    
Attending Only

## 2024-10-06 NOTE — HEALTH RISK ASSESSMENT
[] : No [No] : In the past 12 months have you used drugs other than those required for medical reasons? No [No falls in past year] : Patient reported no falls in the past year [0] : 2) Feeling down, depressed, or hopeless: Not at all (0) [Audit-CScore] : 0 [de-identified] : walking  [de-identified] : regural  [[id number]] : 0 no

## 2025-03-21 NOTE — ED ADULT TRIAGE NOTE - PAIN: PRESENCE, MLM
Post-Operative Discharge Instructions   [facility]   Activity:   o No strenuous exercise or heavy lifting (over 10 pounds) until your follow up with doctor   o No driving until following up with doctor   o You may bear weight to your right heel for pivots and transfers only. Wear a surgical shoe on the right foot. You should keep weight off of your foot as much as possible to help with healing and avoid pressure to the area. Use a walker, crutches, or wheelchair for assistance.   o Keep your affected foot elevated on two pillows while in bed and sitting in a chair to decrease swelling   Nutrition:   o Eating more protein will help with wound healing, If possible, add protein to your diet to help increase healing factors.   Wound Care Instructions:   o No soaking your foot until it is completely healed.   o It is important that you keep your dressing clean, dry, and intact. Should your dressing become wet you must call your physician to make an immediate appointment to have the dressing changed. If you are taking a shower, you must use a commercial cast protector. We recommend, certainly, for the initial two week period that you take a sponge bath rather than a shower with its risk of a fall.   Medications:   o Resume all home medications unless otherwise directed by doctor or nurse practitioner   Reasons to Call:   o Severe and persistent shortness of breath not relieved with rest   o Fever above 101.5 oF   o Redness, swelling or drainage from incision   Follow Up Appointment:   o Call to schedule an appointment with your physician/surgeon after discharge   Dr. Eagle Santiago, Dr. Milton Marcial, and Dr. Ab Garcia  [phone]   [address], [place] [address]     denies pain/discomfort